# Patient Record
Sex: FEMALE | Race: BLACK OR AFRICAN AMERICAN | NOT HISPANIC OR LATINO | URBAN - METROPOLITAN AREA
[De-identification: names, ages, dates, MRNs, and addresses within clinical notes are randomized per-mention and may not be internally consistent; named-entity substitution may affect disease eponyms.]

---

## 2023-12-08 ENCOUNTER — OFFICE VISIT (OUTPATIENT)
Dept: OBSTETRICS AND GYNECOLOGY | Facility: CLINIC | Age: 23
End: 2023-12-08
Payer: MEDICAID

## 2023-12-08 VITALS
BODY MASS INDEX: 31.65 KG/M2 | WEIGHT: 185.4 LBS | HEIGHT: 64 IN | DIASTOLIC BLOOD PRESSURE: 76 MMHG | SYSTOLIC BLOOD PRESSURE: 108 MMHG

## 2023-12-08 DIAGNOSIS — Z30.8 ENCOUNTER FOR OTHER CONTRACEPTIVE MANAGEMENT: ICD-10-CM

## 2023-12-08 DIAGNOSIS — Z30.42 SURVEILLANCE FOR DEPO-PROVERA CONTRACEPTION: Primary | ICD-10-CM

## 2023-12-08 PROCEDURE — 2500000004 HC RX 250 GENERAL PHARMACY W/ HCPCS (ALT 636 FOR OP/ED)

## 2023-12-08 RX ORDER — MEDROXYPROGESTERONE ACETATE 150 MG/ML
150 INJECTION, SUSPENSION INTRAMUSCULAR ONCE
Status: COMPLETED | OUTPATIENT
Start: 2023-12-08 | End: 2023-12-08

## 2023-12-08 RX ADMIN — MEDROXYPROGESTERONE ACETATE 150 MG: 150 INJECTION, SUSPENSION INTRAMUSCULAR at 11:08

## 2023-12-08 ASSESSMENT — PAIN SCALES - GENERAL: PAINLEVEL: 0-NO PAIN

## 2024-01-12 ENCOUNTER — APPOINTMENT (OUTPATIENT)
Dept: OBSTETRICS AND GYNECOLOGY | Facility: CLINIC | Age: 24
End: 2024-01-12

## 2024-05-26 ENCOUNTER — APPOINTMENT (OUTPATIENT)
Dept: RADIOLOGY | Facility: HOSPITAL | Age: 24
End: 2024-05-26
Payer: MEDICARE

## 2024-05-26 ENCOUNTER — HOSPITAL ENCOUNTER (EMERGENCY)
Facility: HOSPITAL | Age: 24
Discharge: HOME | End: 2024-05-26
Payer: MEDICARE

## 2024-05-26 VITALS
HEART RATE: 80 BPM | RESPIRATION RATE: 18 BRPM | TEMPERATURE: 98.2 F | BODY MASS INDEX: 32.44 KG/M2 | DIASTOLIC BLOOD PRESSURE: 67 MMHG | HEIGHT: 64 IN | OXYGEN SATURATION: 100 % | SYSTOLIC BLOOD PRESSURE: 134 MMHG | WEIGHT: 190 LBS

## 2024-05-26 DIAGNOSIS — N93.9 VAGINA BLEEDING: Primary | ICD-10-CM

## 2024-05-26 DIAGNOSIS — R11.2 NAUSEA AND VOMITING, UNSPECIFIED VOMITING TYPE: ICD-10-CM

## 2024-05-26 LAB
ALBUMIN SERPL-MCNC: 4.2 G/DL (ref 3.5–5)
ALP BLD-CCNC: 122 U/L (ref 35–125)
ALT SERPL-CCNC: 14 U/L (ref 5–40)
ANION GAP SERPL CALC-SCNC: 10 MMOL/L
APPEARANCE UR: CLEAR
AST SERPL-CCNC: 14 U/L (ref 5–40)
BASOPHILS # BLD AUTO: 0.07 X10*3/UL (ref 0–0.1)
BASOPHILS NFR BLD AUTO: 0.7 %
BILIRUB SERPL-MCNC: <0.2 MG/DL (ref 0.1–1.2)
BILIRUB UR STRIP.AUTO-MCNC: NEGATIVE MG/DL
BUN SERPL-MCNC: 11 MG/DL (ref 8–25)
CALCIUM SERPL-MCNC: 9.5 MG/DL (ref 8.5–10.4)
CHLORIDE SERPL-SCNC: 104 MMOL/L (ref 97–107)
CO2 SERPL-SCNC: 24 MMOL/L (ref 24–31)
COLOR UR: ABNORMAL
CREAT SERPL-MCNC: 0.7 MG/DL (ref 0.4–1.6)
EGFRCR SERPLBLD CKD-EPI 2021: >90 ML/MIN/1.73M*2
EOSINOPHIL # BLD AUTO: 0.19 X10*3/UL (ref 0–0.7)
EOSINOPHIL NFR BLD AUTO: 1.8 %
ERYTHROCYTE [DISTWIDTH] IN BLOOD BY AUTOMATED COUNT: 15.9 % (ref 11.5–14.5)
GLUCOSE SERPL-MCNC: 77 MG/DL (ref 65–99)
GLUCOSE UR STRIP.AUTO-MCNC: NORMAL MG/DL
HCG SERPL-ACNC: <1 MIU/ML
HCT VFR BLD AUTO: 40.9 % (ref 36–46)
HGB BLD-MCNC: 12.7 G/DL (ref 12–16)
IMM GRANULOCYTES # BLD AUTO: 0.03 X10*3/UL (ref 0–0.7)
IMM GRANULOCYTES NFR BLD AUTO: 0.3 % (ref 0–0.9)
KETONES UR STRIP.AUTO-MCNC: NEGATIVE MG/DL
LEUKOCYTE ESTERASE UR QL STRIP.AUTO: ABNORMAL
LIPASE SERPL-CCNC: 25 U/L (ref 16–63)
LYMPHOCYTES # BLD AUTO: 2.22 X10*3/UL (ref 1.2–4.8)
LYMPHOCYTES NFR BLD AUTO: 21.3 %
MCH RBC QN AUTO: 25 PG (ref 26–34)
MCHC RBC AUTO-ENTMCNC: 31.1 G/DL (ref 32–36)
MCV RBC AUTO: 81 FL (ref 80–100)
MONOCYTES # BLD AUTO: 0.67 X10*3/UL (ref 0.1–1)
MONOCYTES NFR BLD AUTO: 6.4 %
MUCOUS THREADS #/AREA URNS AUTO: ABNORMAL /LPF
NEUTROPHILS # BLD AUTO: 7.25 X10*3/UL (ref 1.2–7.7)
NEUTROPHILS NFR BLD AUTO: 69.5 %
NITRITE UR QL STRIP.AUTO: NEGATIVE
NRBC BLD-RTO: 0 /100 WBCS (ref 0–0)
PH UR STRIP.AUTO: 7 [PH]
PLATELET # BLD AUTO: 519 X10*3/UL (ref 150–450)
POTASSIUM SERPL-SCNC: 4.1 MMOL/L (ref 3.4–5.1)
PROT SERPL-MCNC: 7.9 G/DL (ref 5.9–7.9)
PROT UR STRIP.AUTO-MCNC: NEGATIVE MG/DL
RBC # BLD AUTO: 5.08 X10*6/UL (ref 4–5.2)
RBC # UR STRIP.AUTO: ABNORMAL /UL
RBC #/AREA URNS AUTO: >20 /HPF
SODIUM SERPL-SCNC: 138 MMOL/L (ref 133–145)
SP GR UR STRIP.AUTO: 1.02
SQUAMOUS #/AREA URNS AUTO: ABNORMAL /HPF
UROBILINOGEN UR STRIP.AUTO-MCNC: NORMAL MG/DL
WBC # BLD AUTO: 10.4 X10*3/UL (ref 4.4–11.3)
WBC #/AREA URNS AUTO: ABNORMAL /HPF

## 2024-05-26 PROCEDURE — 36415 COLL VENOUS BLD VENIPUNCTURE: CPT

## 2024-05-26 PROCEDURE — 96374 THER/PROPH/DIAG INJ IV PUSH: CPT

## 2024-05-26 PROCEDURE — 87491 CHLMYD TRACH DNA AMP PROBE: CPT | Mod: WESLAB

## 2024-05-26 PROCEDURE — 76856 US EXAM PELVIC COMPLETE: CPT

## 2024-05-26 PROCEDURE — 87086 URINE CULTURE/COLONY COUNT: CPT | Mod: WESLAB

## 2024-05-26 PROCEDURE — 81001 URINALYSIS AUTO W/SCOPE: CPT

## 2024-05-26 PROCEDURE — 76856 US EXAM PELVIC COMPLETE: CPT | Performed by: RADIOLOGY

## 2024-05-26 PROCEDURE — 80053 COMPREHEN METABOLIC PANEL: CPT

## 2024-05-26 PROCEDURE — 83690 ASSAY OF LIPASE: CPT

## 2024-05-26 PROCEDURE — 85025 COMPLETE CBC W/AUTO DIFF WBC: CPT

## 2024-05-26 PROCEDURE — 76830 TRANSVAGINAL US NON-OB: CPT | Performed by: RADIOLOGY

## 2024-05-26 PROCEDURE — 2500000004 HC RX 250 GENERAL PHARMACY W/ HCPCS (ALT 636 FOR OP/ED)

## 2024-05-26 PROCEDURE — 84702 CHORIONIC GONADOTROPIN TEST: CPT

## 2024-05-26 PROCEDURE — 96361 HYDRATE IV INFUSION ADD-ON: CPT

## 2024-05-26 PROCEDURE — 99284 EMERGENCY DEPT VISIT MOD MDM: CPT | Mod: 25

## 2024-05-26 RX ORDER — ONDANSETRON HYDROCHLORIDE 2 MG/ML
4 INJECTION, SOLUTION INTRAVENOUS ONCE
Status: COMPLETED | OUTPATIENT
Start: 2024-05-26 | End: 2024-05-26

## 2024-05-26 RX ORDER — ONDANSETRON 4 MG/1
4 TABLET, FILM COATED ORAL EVERY 6 HOURS
Qty: 12 TABLET | Refills: 0 | Status: SHIPPED | OUTPATIENT
Start: 2024-05-26 | End: 2024-05-29

## 2024-05-26 RX ADMIN — SODIUM CHLORIDE 1000 ML: 900 INJECTION, SOLUTION INTRAVENOUS at 21:08

## 2024-05-26 RX ADMIN — ONDANSETRON 4 MG: 2 INJECTION INTRAMUSCULAR; INTRAVENOUS at 21:09

## 2024-05-26 ASSESSMENT — PAIN - FUNCTIONAL ASSESSMENT: PAIN_FUNCTIONAL_ASSESSMENT: 0-10

## 2024-05-26 ASSESSMENT — PAIN SCALES - GENERAL: PAINLEVEL_OUTOF10: 0 - NO PAIN

## 2024-05-26 ASSESSMENT — COLUMBIA-SUICIDE SEVERITY RATING SCALE - C-SSRS
1. IN THE PAST MONTH, HAVE YOU WISHED YOU WERE DEAD OR WISHED YOU COULD GO TO SLEEP AND NOT WAKE UP?: NO
6. HAVE YOU EVER DONE ANYTHING, STARTED TO DO ANYTHING, OR PREPARED TO DO ANYTHING TO END YOUR LIFE?: NO
2. HAVE YOU ACTUALLY HAD ANY THOUGHTS OF KILLING YOURSELF?: NO

## 2024-05-26 NOTE — Clinical Note
Ursula Bocanegra was seen and treated in our emergency department on 5/26/2024.  She may return to work on 05/28/2024.       If you have any questions or concerns, please don't hesitate to call.      Pema Fisher PA-C

## 2024-05-27 LAB — HOLD SPECIMEN: NORMAL

## 2024-05-27 NOTE — ED PROVIDER NOTES
HPI   Chief Complaint   Patient presents with    Vomiting     Nausea for the past week. Vomiting the last couple of days. Has had some spotting as well, along with a migraine. Took Plan B last week, but was feeling this way since before then.       Patient is a 23-year-old female presenting to the emergency department for evaluation of nausea, vomiting, and vaginal bleeding.  Patient states she has been off of Depo-Provera shot since March.  She states since then she has had intermittent spotting and abnormal periods.  She states she also has had some occasional vomiting.  She states 1 week ago she had unprotected sex and took a Plan B.  She states since then she has had increased nausea, vomiting, and increased vaginal bleeding.  She states it has progressively worsened over the past 2 days.  She denies chest pain, shortness of breath, fever, chills, recent travel, recent illness, cough, congestion, headaches, numbness, tingling, dysuria, constipation, diarrhea.  Patient also concerned about STDs and asking for testing.                          [place] Coma Scale Score: 15                     Patient History   Past Medical History:   Diagnosis Date    Other conditions influencing health status 04/21/2015    No significant past surgical history    Personal history of other diseases of the respiratory system 04/21/2015    History of asthma    Personal history of other mental and behavioral disorders     History of behavior problem    Personal history of other mental and behavioral disorders 12/03/2017    History of depression    Personal history of other mental and behavioral disorders 12/03/2017    History of attention deficit hyperactivity disorder (ADHD)    Personal history of other specified conditions     History of lump of left breast    Personal history of other specified conditions 12/20/2017    History of lump of left breast    Right upper quadrant pain     Abdominal pain, RUQ (right upper quadrant)     Unspecified mood (affective) disorder ([facility]) 12/03/2017    Mood disorder     Past Surgical History:   Procedure Laterality Date    OTHER SURGICAL HISTORY  03/17/2020    Scar removal    OTHER SURGICAL HISTORY  03/17/2020    Breast surgery     No family history on file.  Social History     Tobacco Use    Smoking status: Not on file    Smokeless tobacco: Not on file   Substance Use Topics    Alcohol use: Not on file    Drug use: Not on file       Physical Exam   ED Triage Vitals [05/26/24 2032]   Temperature Heart Rate Respirations BP   36.8 °C (98.2 °F) 91 18 (!) 118/91      SpO2 Temp Source Heart Rate Source Patient Position   -- Tympanic -- Sitting      BP Location FiO2 (%)     Left arm --       Physical Exam  Vitals and nursing note reviewed.   Constitutional:       General: She is not in acute distress.     Appearance: Normal appearance. She is not ill-appearing or toxic-appearing.   HENT:      Head: Normocephalic and atraumatic.      Nose: Nose normal.      Mouth/Throat:      Mouth: Mucous membranes are moist.   Eyes:      Pupils: Pupils are equal, round, and reactive to light.   Cardiovascular:      Rate and Rhythm: Normal rate and regular rhythm.      Pulses: Normal pulses.      Heart sounds: Normal heart sounds. No murmur heard.     No friction rub. No gallop.   Pulmonary:      Effort: Pulmonary effort is normal.      Breath sounds: Normal breath sounds. No wheezing, rhonchi or rales.   Abdominal:      Palpations: Abdomen is soft.      Tenderness: There is abdominal tenderness (Suprapubic tenderness). There is no guarding or rebound.   Musculoskeletal:         General: Normal range of motion.      Cervical back: Normal range of motion.   Skin:     General: Skin is warm and dry.   Neurological:      General: No focal deficit present.      Mental Status: She is alert and oriented to person, place, and time.      Sensory: No sensory deficit.      Motor: No weakness.   Psychiatric:         Mood and Affect: Mood  normal.         Behavior: Behavior normal.         ED Course & MDM   Diagnoses as of 05/27/24 0237   Nausea and vomiting, unspecified vomiting type       Medical Decision Making  **Disclaimer parts of this chart have been completed using voice recognition software. Please excuse any errors of transcription.     Evaluated this patient independently and my supervising physician was available for consultation.    HPI: Detailed above.    Exam: A medically appropriate exam performed, outlined above, given the known history and presentation.    History obtained from: Patient    Labs/Diagnostics:  Labs Reviewed   CBC WITH AUTO DIFFERENTIAL - Abnormal       Result Value    WBC 10.4      nRBC 0.0      RBC 5.08      Hemoglobin 12.7      Hematocrit 40.9      MCV 81      MCH 25.0 (*)     MCHC 31.1 (*)     RDW 15.9 (*)     Platelets 519 (*)     Neutrophils % 69.5      Immature Granulocytes %, Automated 0.3      Lymphocytes % 21.3      Monocytes % 6.4      Eosinophils % 1.8      Basophils % 0.7      Neutrophils Absolute 7.25      Immature Granulocytes Absolute, Automated 0.03      Lymphocytes Absolute 2.22      Monocytes Absolute 0.67      Eosinophils Absolute 0.19      Basophils Absolute 0.07     URINALYSIS WITH REFLEX CULTURE AND MICROSCOPIC - Abnormal    Color, Urine Light-Yellow      Appearance, Urine Clear      Specific Gravity, Urine 1.019      pH, Urine 7.0      Protein, Urine NEGATIVE      Glucose, Urine Normal      Blood, Urine 1.0 (3+) (*)     Ketones, Urine NEGATIVE      Bilirubin, Urine NEGATIVE      Urobilinogen, Urine Normal      Nitrite, Urine NEGATIVE      Leukocyte Esterase, Urine 75 Rodger/µL (*)    MICROSCOPIC ONLY, URINE - Abnormal    WBC, Urine 11-20 (*)     RBC, Urine >20 (*)     Squamous Epithelial Cells, Urine 1-9 (SPARSE)      Mucus, Urine FEW     COMPREHENSIVE METABOLIC PANEL - Normal    Glucose 77      Sodium 138      Potassium 4.1      Chloride 104      Bicarbonate 24      Urea Nitrogen 11      Creatinine  0.70      eGFR >90      Calcium 9.5      Albumin 4.2      Alkaline Phosphatase 122      Total Protein 7.9      AST 14      Bilirubin, Total <0.2      ALT 14      Anion Gap 10     LIPASE - Normal    Lipase 25     URINE CULTURE   HUMAN CHORIONIC GONADOTROPIN, SERUM QUANTITATIVE    HCG, Beta-Quantitative <1     URINALYSIS WITH REFLEX CULTURE AND MICROSCOPIC    Narrative:     The following orders were created for panel order Urinalysis with Reflex Culture and Microscopic.  Procedure                               Abnormality         Status                     ---------                               -----------         ------                     Urinalysis with Reflex C...[[id number]]  Abnormal            Final result               Extra Urine Gray Tube[[id number]]                            In process                   Please view results for these tests on the individual orders.   EXTRA URINE GRAY TUBE   C. TRACHOMATIS + N. GONORRHOEAE, AMPLIFIED     US PELVIS TRANSABDOMINAL WITH TRANSVAGINAL   Final Result   Unremarkable pelvic ultrasound.        MACRO:   None.        Signed by: Evan Finkelstein 5/26/2024 11:05 PM   Dictation workstation:   [id number]        EMERGENCY DEPARTMENT COURSE and DIFFERENTIAL DIAGNOSIS/MDM:  Patient is a 23-year-old female presenting to the emergency department for evaluation of nausea, vomiting, and abdominal pain.  On physical exam vital signs remarkable for diastolic hypertension but otherwise stable patient is in no acute distress.  She is in generalized tenderness to palpation suprapubic region with no rebound or guarding.  Diagnostic labs ordered as well as ultrasound of the pelvis.  CMP showed no electrolyte abnormalities.  Lipase normal.  Quantitative hCG negative.  CBC showed no leukocytosis or anemia.  Urine showed no evidence of infection as there is no bacteria despite the leukocyte esterase she does have squamous epithelial cells.  Ultrasound of the pelvis showed unremarkable  "pelvic ultrasound with no cysts.  Patient was discharged in stable condition.  She will follow-up with primary care physician and OB/GYN outpatient within the next 1 to 2 days to discuss further birth control plans.  She will return to the emergency department with any new or worsening symptoms.    Discussed with patient that more than 50% of abdominal pain that comes to the Emergency Department goes undiagnosed and that there were no emergent findings in workup today. Discussed that certain diagnoese such as appendicitis, colitis, diverticulitis, cholelithiasis or other illnesses are undetectable early on in their course and may not be seen on the first visit.  I recommended abdominal re-examination in 12-24 hours if  symptoms are not significantly improved, sooner if worsening.    Emergent pathologies were considered for this patient, although I have low suspicion for anything acutely emergent given patient's clinical presentation, history, physical exam, stable vital signs, and relatively unremarkable workup.  Discharging patient home is reasonable plan of care for outpatient management.     All labs, imaging, and diagnostic studies were reviewed by me and patient was counseled on clinical impression, expectations, and plan.  Patient was educated to follow-up with PCP in the following 1-2 days.  All questions from patient were answered. They elicited understanding and were agreeable to course of treatment.  Patient was discharged in stable condition and given strict return precautions.      Vitals:    Vitals:    05/26/24 2032 05/26/24 2350   BP: (!) 118/91 134/67   BP Location: Left arm    Patient Position: Sitting    Pulse: 91 80   Resp: 18 18   Temp: 36.8 °C (98.2 °F)    TempSrc: Tympanic    SpO2:  100%   Weight: 86.2 kg (190 lb)    Height: 1.626 m (5' 4\")      History Limited by:    None    Independent history obtained from:    None    External records reviewed:    None    Diagnostics interpreted by " me:    None    Discussions with other clinicians:    None    Chronic conditions impacting care:    None    Social determinants of health affecting care:    None    Diagnostic tests considered but not performed: None    ED Medications managed:    Medications   sodium chloride 0.9 % bolus 1,000 mL (0 mL intravenous Stopped 5/26/24 2208)   ondansetron (Zofran) injection 4 mg (4 mg intravenous Given 5/26/24 2109)       Prescription drugs considered:    Antiemetics Zofran    Screenings:              Procedure  Procedures     Pema Fisher PA-C  05/27/24 0236

## 2024-05-27 NOTE — DISCHARGE INSTRUCTIONS
Follow-up with primary care physician and OB/GYN outpatient within the next 1 to 2 days.  Return to the emergency department anytime with any new or worsening symptoms.

## 2024-05-28 LAB
BACTERIA UR CULT: NORMAL
C TRACH RRNA SPEC QL NAA+PROBE: NEGATIVE
N GONORRHOEA DNA SPEC QL PROBE+SIG AMP: NEGATIVE

## 2024-05-31 ENCOUNTER — TELEPHONE (OUTPATIENT)
Dept: OBSTETRICS AND GYNECOLOGY | Facility: CLINIC | Age: 24
End: 2024-05-31
Payer: MEDICARE

## 2024-05-31 NOTE — TELEPHONE ENCOUNTER
Est pt calling to schedule ER f/u. Pt states she was seen in ER for heavy menses and vomiting. Pt took a plan B and was very sick after. Pt missed last depo. Appt made for 06/11.

## 2024-06-11 ENCOUNTER — APPOINTMENT (OUTPATIENT)
Dept: OBSTETRICS AND GYNECOLOGY | Facility: CLINIC | Age: 24
End: 2024-06-11
Payer: MEDICARE

## 2024-06-26 PROCEDURE — 87086 URINE CULTURE/COLONY COUNT: CPT

## 2024-06-26 PROCEDURE — 87591 N.GONORRHOEAE DNA AMP PROB: CPT

## 2024-06-26 PROCEDURE — 87661 TRICHOMONAS VAGINALIS AMPLIF: CPT

## 2024-06-26 PROCEDURE — 87491 CHLMYD TRACH DNA AMP PROBE: CPT

## 2024-06-27 ENCOUNTER — LAB REQUISITION (OUTPATIENT)
Dept: LAB | Facility: HOSPITAL | Age: 24
End: 2024-06-27
Payer: COMMERCIAL

## 2024-06-27 DIAGNOSIS — J20.9 ACUTE BRONCHITIS, UNSPECIFIED: ICD-10-CM

## 2024-06-27 DIAGNOSIS — A56.01 CHLAMYDIAL CYSTITIS AND URETHRITIS: ICD-10-CM

## 2024-06-27 DIAGNOSIS — B37.31 ACUTE CANDIDIASIS OF VULVA AND VAGINA: ICD-10-CM

## 2024-06-27 DIAGNOSIS — R30.0 DYSURIA: ICD-10-CM

## 2024-06-27 LAB
C TRACH RRNA SPEC QL NAA+PROBE: POSITIVE
N GONORRHOEA DNA SPEC QL PROBE+SIG AMP: NEGATIVE
T VAGINALIS RRNA SPEC QL NAA+PROBE: POSITIVE

## 2024-06-28 LAB — BACTERIA UR CULT: NORMAL

## 2024-07-02 ENCOUNTER — OFFICE VISIT (OUTPATIENT)
Dept: PRIMARY CARE | Facility: CLINIC | Age: 24
End: 2024-07-02
Payer: COMMERCIAL

## 2024-07-02 ENCOUNTER — LAB (OUTPATIENT)
Dept: LAB | Facility: LAB | Age: 24
End: 2024-07-02
Payer: COMMERCIAL

## 2024-07-02 VITALS
HEIGHT: 64 IN | WEIGHT: 191 LBS | HEART RATE: 114 BPM | SYSTOLIC BLOOD PRESSURE: 112 MMHG | OXYGEN SATURATION: 98 % | RESPIRATION RATE: 18 BRPM | BODY MASS INDEX: 32.61 KG/M2 | DIASTOLIC BLOOD PRESSURE: 80 MMHG | TEMPERATURE: 97.6 F

## 2024-07-02 DIAGNOSIS — Z00.01 ENCOUNTER FOR WELL ADULT EXAM WITH ABNORMAL FINDINGS: Primary | ICD-10-CM

## 2024-07-02 DIAGNOSIS — A74.9 POSITIVE CHLAMYIDA TEST: ICD-10-CM

## 2024-07-02 DIAGNOSIS — F41.9 ANXIETY AND DEPRESSION: ICD-10-CM

## 2024-07-02 DIAGNOSIS — F32.A ANXIETY AND DEPRESSION: ICD-10-CM

## 2024-07-02 DIAGNOSIS — Z20.2 EXPOSURE TO STD: ICD-10-CM

## 2024-07-02 DIAGNOSIS — G47.30 SLEEP APNEA, UNSPECIFIED TYPE: ICD-10-CM

## 2024-07-02 LAB
HIV 1+2 AB+HIV1 P24 AG SERPL QL IA: NONREACTIVE
TREPONEMA PALLIDUM IGG+IGM AB [PRESENCE] IN SERUM OR PLASMA BY IMMUNOASSAY: NONREACTIVE

## 2024-07-02 PROCEDURE — 36415 COLL VENOUS BLD VENIPUNCTURE: CPT

## 2024-07-02 PROCEDURE — 87389 HIV-1 AG W/HIV-1&-2 AB AG IA: CPT

## 2024-07-02 PROCEDURE — 87491 CHLMYD TRACH DNA AMP PROBE: CPT

## 2024-07-02 PROCEDURE — 87591 N.GONORRHOEAE DNA AMP PROB: CPT

## 2024-07-02 PROCEDURE — 99385 PREV VISIT NEW AGE 18-39: CPT | Performed by: NURSE PRACTITIONER

## 2024-07-02 PROCEDURE — 1036F TOBACCO NON-USER: CPT | Performed by: NURSE PRACTITIONER

## 2024-07-02 PROCEDURE — 99213 OFFICE O/P EST LOW 20 MIN: CPT | Performed by: NURSE PRACTITIONER

## 2024-07-02 PROCEDURE — 87661 TRICHOMONAS VAGINALIS AMPLIF: CPT

## 2024-07-02 PROCEDURE — 86780 TREPONEMA PALLIDUM: CPT

## 2024-07-02 RX ORDER — ALBUTEROL SULFATE 90 UG/1
2 AEROSOL, METERED RESPIRATORY (INHALATION) 4 TIMES DAILY PRN
COMMUNITY

## 2024-07-02 ASSESSMENT — ENCOUNTER SYMPTOMS
CONSTITUTIONAL NEGATIVE: 1
MUSCULOSKELETAL NEGATIVE: 1
EYES NEGATIVE: 1
ALLERGIC/IMMUNOLOGIC NEGATIVE: 1
CARDIOVASCULAR NEGATIVE: 1
NERVOUS/ANXIOUS: 1
HEMATOLOGIC/LYMPHATIC NEGATIVE: 1
GASTROINTESTINAL NEGATIVE: 1
ENDOCRINE NEGATIVE: 1
NEUROLOGICAL NEGATIVE: 1
RESPIRATORY NEGATIVE: 1

## 2024-07-02 ASSESSMENT — LIFESTYLE VARIABLES
HAS A RELATIVE, FRIEND, DOCTOR, OR ANOTHER HEALTH PROFESSIONAL EXPRESSED CONCERN ABOUT YOUR DRINKING OR SUGGESTED YOU CUT DOWN: NO
SKIP TO QUESTIONS 9-10: 0
HOW MANY STANDARD DRINKS CONTAINING ALCOHOL DO YOU HAVE ON A TYPICAL DAY: 3 OR 4
HOW OFTEN DO YOU HAVE A DRINK CONTAINING ALCOHOL: 2-4 TIMES A MONTH
HOW OFTEN DO YOU HAVE SIX OR MORE DRINKS ON ONE OCCASION: NEVER
HAVE YOU OR SOMEONE ELSE BEEN INJURED AS A RESULT OF YOUR DRINKING: NO
AUDIT-C TOTAL SCORE: 3
AUDIT TOTAL SCORE: -1

## 2024-07-02 ASSESSMENT — PATIENT HEALTH QUESTIONNAIRE - PHQ9
2. FEELING DOWN, DEPRESSED OR HOPELESS: NOT AT ALL
SUM OF ALL RESPONSES TO PHQ9 QUESTIONS 1 AND 2: 0
1. LITTLE INTEREST OR PLEASURE IN DOING THINGS: NOT AT ALL

## 2024-07-02 ASSESSMENT — PAIN SCALES - GENERAL: PAINLEVEL: 0-NO PAIN

## 2024-07-02 NOTE — PROGRESS NOTES
"Chief Complaint  Ursula Bocanegra is a 23 y.o. female presenting for \"Annual Exam (Physical- asking for complete std panel including HIV/GOT NEXPLANON 6/21/24).\"    HPI     Ursula Bocanegra is a 23 y.o. female presenting for an annual exam and she would like STD tested due to testing positive for chlamydia and trich recently.   She was treated and would like make sure she is clear of any STDs, has a nexplanon Has and issue with heartburn, Snores loudly, has had a sleep study in the past but she was pregnant in first trimester and couldn't sleep.  So she never got results.       Past Medical History  Patient Active Problem List    Diagnosis Date Noted    Surveillance for Depo-Provera contraception 12/08/2023    Encounter for other contraceptive management 12/08/2023        Medications  Current Outpatient Medications   Medication Instructions    albuterol 90 mcg/actuation inhaler 2 puffs, inhalation, 4 times daily PRN        Surgical History  She has a past surgical history that includes Other surgical history (03/17/2020) and Other surgical history (03/17/2020).     Social History  She reports that she has never smoked. She has never been exposed to tobacco smoke. She has never used smokeless tobacco. She reports current alcohol use. She reports that she does not use drugs.    Family History  No family history on file.     Allergies  Bee pollen and Dog dander    ROS  Review of Systems   Constitutional: Negative.    HENT: Negative.     Eyes: Negative.    Respiratory: Negative.     Cardiovascular: Negative.    Gastrointestinal: Negative.    Endocrine: Negative.    Genitourinary: Negative.    Musculoskeletal: Negative.    Skin:  Positive for rash.   Allergic/Immunologic: Negative.    Neurological: Negative.    Hematological: Negative.    Psychiatric/Behavioral:  The patient is nervous/anxious.         Last Recorded Vitals  /80 (BP Location: Left arm, Patient Position: Sitting, BP Cuff Size: Adult)   Pulse (!) 114 "   Temp 36.4 °C (97.6 °F)   Resp 18   Wt 86.6 kg (191 lb)   SpO2 98%     Physical Exam  Vitals and nursing note reviewed.   Constitutional:       Appearance: Normal appearance.   HENT:      Head: Normocephalic and atraumatic.      Right Ear: Tympanic membrane, ear canal and external ear normal.      Left Ear: Tympanic membrane, ear canal and external ear normal.      Nose: Nose normal.      Mouth/Throat:      Mouth: Mucous membranes are moist.      Pharynx: Oropharynx is clear.   Eyes:      Extraocular Movements: Extraocular movements intact.      Conjunctiva/sclera: Conjunctivae normal.      Pupils: Pupils are equal, round, and reactive to light.   Neck:      Thyroid: No thyromegaly.   Cardiovascular:      Rate and Rhythm: Normal rate and regular rhythm.      Pulses: Normal pulses.      Heart sounds: Normal heart sounds, S1 normal and S2 normal.   Pulmonary:      Effort: Pulmonary effort is normal.      Breath sounds: Normal breath sounds. No wheezing or rhonchi.   Abdominal:      General: Bowel sounds are normal.      Palpations: Abdomen is soft. There is no mass.      Tenderness: There is no abdominal tenderness. There is no guarding.   Genitourinary:     Comments: Not examined  Musculoskeletal:         General: Normal range of motion.      Cervical back: Normal range of motion.      Right lower leg: No edema.      Left lower leg: No edema.   Lymphadenopathy:      Cervical: No cervical adenopathy.   Skin:     General: Skin is warm and dry.      Capillary Refill: Capillary refill takes less than 2 seconds.      Findings: No rash.   Neurological:      General: No focal deficit present.      Mental Status: She is alert and oriented to person, place, and time. Mental status is at baseline.      Cranial Nerves: Cranial nerves 2-12 are intact. No cranial nerve deficit.      Sensory: Sensation is intact.      Motor: Motor function is intact.      Coordination: Coordination is intact.      Gait: Gait is intact.    Psychiatric:         Mood and Affect: Mood normal.         Behavior: Behavior normal.         Thought Content: Thought content normal.         Judgment: Judgment normal.         Relevant Results      Assessment/Plan   Ursula was seen today for annual exam.  Diagnoses and all orders for this visit:  Encounter for well adult exam with abnormal findings (Primary)  Exposure to STD  -     Syphilis Screen with Reflex; Future  -     C. Trachomatis / N. Gonorrhoeae, Amplified Detection; Future  -     HIV 1/2 Antigen/Antibody Screen with Reflex to Confirmation; Future  -     Trichomonas vaginalis, Nucleic Acid Detection; Future  Anxiety and depression  -     Referral to Psychology; Future  Sleep apnea, unspecified type  -     Referral to Adult Sleep Medicine; Future          COUNSELING      Medication education:              Education:  The patient is counseled regarding potential side-effects of any and all new medications             Understanding:  Patient expressed understanding             Adherence:  No barriers to adherence identified        Nenita Brian, APRN-CNP

## 2024-07-03 PROBLEM — A74.9 POSITIVE CHLAMYIDA TEST: Status: ACTIVE | Noted: 2024-07-03

## 2024-07-03 LAB
C TRACH RRNA SPEC QL NAA+PROBE: POSITIVE
N GONORRHOEA DNA SPEC QL PROBE+SIG AMP: NEGATIVE
T VAGINALIS RRNA SPEC QL NAA+PROBE: NEGATIVE

## 2024-07-03 RX ORDER — AZITHROMYCIN 250 MG/1
1000 TABLET, FILM COATED ORAL ONCE
Qty: 4 TABLET | Refills: 0 | Status: SHIPPED | OUTPATIENT
Start: 2024-07-03 | End: 2024-07-03

## 2024-07-03 NOTE — RESULT ENCOUNTER NOTE
Advise patient that she has positive for chlamydia I have sent azithromycin 1000 mg total she will take 4 tablets 1 time advised her to use condoms and she needs to make sure that any partners are being treated also

## 2024-12-06 ENCOUNTER — APPOINTMENT (OUTPATIENT)
Dept: CARDIOLOGY | Facility: HOSPITAL | Age: 24
End: 2024-12-06
Payer: COMMERCIAL

## 2024-12-06 ENCOUNTER — HOSPITAL ENCOUNTER (EMERGENCY)
Facility: HOSPITAL | Age: 24
Discharge: HOME | End: 2024-12-06
Payer: COMMERCIAL

## 2024-12-06 VITALS
RESPIRATION RATE: 18 BRPM | OXYGEN SATURATION: 99 % | HEIGHT: 65 IN | WEIGHT: 190 LBS | HEART RATE: 85 BPM | DIASTOLIC BLOOD PRESSURE: 87 MMHG | TEMPERATURE: 97.7 F | SYSTOLIC BLOOD PRESSURE: 137 MMHG | BODY MASS INDEX: 31.65 KG/M2

## 2024-12-06 DIAGNOSIS — J45.41 MODERATE PERSISTENT ASTHMA WITH EXACERBATION (HHS-HCC): Primary | ICD-10-CM

## 2024-12-06 LAB
ATRIAL RATE: 93 BPM
FLUAV RNA RESP QL NAA+PROBE: NOT DETECTED
FLUBV RNA RESP QL NAA+PROBE: NOT DETECTED
P AXIS: 64 DEGREES
P OFFSET: 209 MS
P ONSET: 162 MS
PR INTERVAL: 124 MS
Q ONSET: 224 MS
QRS COUNT: 15 BEATS
QRS DURATION: 72 MS
QT INTERVAL: 344 MS
QTC CALCULATION(BAZETT): 427 MS
QTC FREDERICIA: 398 MS
R AXIS: 57 DEGREES
SARS-COV-2 RNA RESP QL NAA+PROBE: NOT DETECTED
T AXIS: 52 DEGREES
T OFFSET: 396 MS
VENTRICULAR RATE: 93 BPM

## 2024-12-06 PROCEDURE — 99284 EMERGENCY DEPT VISIT MOD MDM: CPT

## 2024-12-06 PROCEDURE — 87636 SARSCOV2 & INF A&B AMP PRB: CPT

## 2024-12-06 PROCEDURE — 94640 AIRWAY INHALATION TREATMENT: CPT | Mod: 59

## 2024-12-06 PROCEDURE — 93005 ELECTROCARDIOGRAM TRACING: CPT

## 2024-12-06 PROCEDURE — 96372 THER/PROPH/DIAG INJ SC/IM: CPT

## 2024-12-06 PROCEDURE — 87635 SARS-COV-2 COVID-19 AMP PRB: CPT

## 2024-12-06 PROCEDURE — 2500000002 HC RX 250 W HCPCS SELF ADMINISTERED DRUGS (ALT 637 FOR MEDICARE OP, ALT 636 FOR OP/ED)

## 2024-12-06 PROCEDURE — 2500000004 HC RX 250 GENERAL PHARMACY W/ HCPCS (ALT 636 FOR OP/ED)

## 2024-12-06 RX ORDER — ALBUTEROL SULFATE 5 MG/ML
2.5 SOLUTION RESPIRATORY (INHALATION) EVERY 6 HOURS PRN
Qty: 20 ML | Refills: 0 | Status: SHIPPED | OUTPATIENT
Start: 2024-12-06 | End: 2025-01-05

## 2024-12-06 RX ORDER — IPRATROPIUM BROMIDE AND ALBUTEROL SULFATE 2.5; .5 MG/3ML; MG/3ML
3 SOLUTION RESPIRATORY (INHALATION) ONCE
Status: COMPLETED | OUTPATIENT
Start: 2024-12-06 | End: 2024-12-06

## 2024-12-06 RX ORDER — METHYLPREDNISOLONE 4 MG/1
TABLET ORAL
Qty: 21 TABLET | Refills: 0 | Status: SHIPPED | OUTPATIENT
Start: 2024-12-06 | End: 2024-12-12

## 2024-12-06 RX ORDER — ALBUTEROL SULFATE 90 UG/1
2 INHALANT RESPIRATORY (INHALATION) EVERY 4 HOURS PRN
Qty: 18 G | Refills: 0 | Status: SHIPPED | OUTPATIENT
Start: 2024-12-06 | End: 2025-01-05

## 2024-12-06 ASSESSMENT — LIFESTYLE VARIABLES
HAVE YOU EVER FELT YOU SHOULD CUT DOWN ON YOUR DRINKING: NO
EVER FELT BAD OR GUILTY ABOUT YOUR DRINKING: NO
TOTAL SCORE: 0
HAVE PEOPLE ANNOYED YOU BY CRITICIZING YOUR DRINKING: NO
EVER HAD A DRINK FIRST THING IN THE MORNING TO STEADY YOUR NERVES TO GET RID OF A HANGOVER: NO

## 2024-12-06 ASSESSMENT — PAIN SCALES - GENERAL: PAINLEVEL_OUTOF10: 0 - NO PAIN

## 2024-12-06 ASSESSMENT — PAIN - FUNCTIONAL ASSESSMENT: PAIN_FUNCTIONAL_ASSESSMENT: 0-10

## 2024-12-06 NOTE — ED TRIAGE NOTES
having centralized chest pain and Trouble breathing the last couple days. Pt has been coughing. Pt has a history of asthma and her inhaler isn't working and has no breathing treatments

## 2024-12-06 NOTE — Clinical Note
Ursula Bocanegra was seen and treated in our emergency department on 12/6/2024.  She may return to work on 12/09/2024.       If you have any questions or concerns, please don't hesitate to call.      Sarwat Hernandez PA-C

## 2024-12-07 NOTE — ED PROVIDER NOTES
HPI   Chief Complaint   Patient presents with    Shortness of Breath       HPI  Patient is a 24-year-old female with self-reported history of asthma who presents to ED for acute shortness of breath.  Patient states she can feel tightness in her chest that has been worsening over the last few days.  She denies any overt chest pain.  She is not in respiratory distress and she is speaking in full sentences.  She denies any recent illness, fever chills, cough or congestion, chest pain, abdominal pain or NVD, urinary symptoms.  No recent hospitalizations or surgeries.  No recent travel or sick contacts.      Patient History   Past Medical History:   Diagnosis Date    Other conditions influencing health status 04/21/2015    No significant past surgical history    Personal history of other diseases of the respiratory system 04/21/2015    History of asthma    Personal history of other mental and behavioral disorders     History of behavior problem    Personal history of other mental and behavioral disorders 12/03/2017    History of depression    Personal history of other mental and behavioral disorders 12/03/2017    History of attention deficit hyperactivity disorder (ADHD)    Personal history of other specified conditions     History of lump of left breast    Personal history of other specified conditions 12/20/2017    History of lump of left breast    Right upper quadrant pain     Abdominal pain, RUQ (right upper quadrant)    Unspecified mood (affective) disorder (CMS-HCC) 12/03/2017    Mood disorder     Past Surgical History:   Procedure Laterality Date    OTHER SURGICAL HISTORY  03/17/2020    Scar removal    OTHER SURGICAL HISTORY  03/17/2020    Breast surgery     No family history on file.  Social History     Tobacco Use    Smoking status: Never     Passive exposure: Never    Smokeless tobacco: Never   Substance Use Topics    Alcohol use: Yes    Drug use: Never       Physical Exam   ED Triage Vitals [12/06/24 1112]    Temperature Heart Rate Respirations BP   36.5 °C (97.7 °F) 85 18 137/87      Pulse Ox Temp Source Heart Rate Source Patient Position   99 % Temporal Monitor Sitting      BP Location FiO2 (%)     Left arm --       Physical Exam  Vitals reviewed.   Constitutional:       General: She is not in acute distress.     Appearance: Normal appearance. She is not ill-appearing.   HENT:      Head: Normocephalic and atraumatic.   Eyes:      Extraocular Movements: Extraocular movements intact.   Cardiovascular:      Rate and Rhythm: Normal rate and regular rhythm.      Heart sounds: Normal heart sounds.   Pulmonary:      Breath sounds: Wheezing present.   Abdominal:      General: Abdomen is flat.   Musculoskeletal:         General: Normal range of motion.      Cervical back: Normal range of motion and neck supple.   Skin:     General: Skin is warm and dry.   Neurological:      Mental Status: She is alert and oriented to person, place, and time.   Psychiatric:         Mood and Affect: Mood normal.         Behavior: Behavior normal.           ED Course & MDM   Diagnoses as of 12/06/24 1923   Moderate persistent asthma with exacerbation ([facility])                 No data recorded     Domingo Coma Scale Score: 15 (12/06/24 1113 : Kimberly Carroll RN)                           Medical Decision Making  Parts of this chart have been completed using voice recognition software. Please excuse any errors of transcription.  My thought process and reason for plan has been formulated from the time that I saw the patient until the time of disposition and is not specific to one specific moment during their visit and furthermore my MDM encompasses this entire chart and not only this text box.    HPI:   A medically appropriate HPI was obtained, outlined above.    Ursula Bocanegra is a  24 y.o. female    Chief Complaint   Patient presents with    Shortness of Breath       Past Medical History:   Diagnosis Date    Other conditions influencing health  "status 04/21/2015    No significant past surgical history    Personal history of other diseases of the respiratory system 04/21/2015    History of asthma    Personal history of other mental and behavioral disorders     History of behavior problem    Personal history of other mental and behavioral disorders 12/03/2017    History of depression    Personal history of other mental and behavioral disorders 12/03/2017    History of attention deficit hyperactivity disorder (ADHD)    Personal history of other specified conditions     History of lump of left breast    Personal history of other specified conditions 12/20/2017    History of lump of left breast    Right upper quadrant pain     Abdominal pain, RUQ (right upper quadrant)    Unspecified mood (affective) disorder ([facility]) 12/03/2017    Mood disorder       Past Surgical History:   Procedure Laterality Date    OTHER SURGICAL HISTORY  03/17/2020    Scar removal    OTHER SURGICAL HISTORY  03/17/2020    Breast surgery       Social History     Tobacco Use    Smoking status: Never     Passive exposure: Never    Smokeless tobacco: Never   Substance Use Topics    Alcohol use: Yes    Drug use: Never       No family history on file.    Allergies   Allergen Reactions    Bee Pollen Hives    Dog Dander Rash       Current Outpatient Medications   Medication Instructions    albuterol 90 mcg/actuation inhaler 2 puffs, inhalation, 4 times daily PRN    albuterol 90 mcg/actuation inhaler 2 puffs, inhalation, Every 4 hours PRN    albuterol 2.5 mg, nebulization, Every 6 hours PRN    methylPREDNISolone (Medrol Dospak) 4 mg tablets Follow schedule on package instructions       History obtained from:   Patient    Exam:   Patient Vitals for the past 24 hrs:   BP Temp Temp src Pulse Resp SpO2 Height Weight   12/06/24 1112 137/87 36.5 °C (97.7 °F) Temporal 85 18 99 % 1.651 m (5' 5\") 86.2 kg (190 lb)       A medically appropriate exam performed, outlined above, given the known history and " presentation.    EKG/Cardiac monitor:     Social Determinants of Health considered during this visit:     Medications given during visit:  Medications   methylPREDNISolone sod succinate (SOLU-Medrol) injection 125 mg (125 mg intramuscular Given 12/6/24 1131)   ipratropium-albuteroL (Duo-Neb) 0.5-2.5 mg/3 mL nebulizer solution 3 mL (3 mL nebulization Given 12/6/24 1222)   ipratropium-albuteroL (Duo-Neb) 0.5-2.5 mg/3 mL nebulizer solution 3 mL (3 mL nebulization Given 12/6/24 1206)        Diagnostic/tests:  Labs Reviewed   SARS-COV-2 PCR - Normal       Result Value    Coronavirus 2019, PCR Not Detected      Narrative:     This assay has received FDA Emergency Use Authorization (EUA) and is only authorized for the duration of time that circumstances exist to justify the authorization of the emergency use of in vitro diagnostic tests for the detection of SARS-CoV-2 virus and/or diagnosis of COVID-19 infection under section 564(b)(1) of the Act, 21 U.S.C. 360bbb-3(b)(1). This assay is an in vitro diagnostic nucleic acid amplification test for the qualitative detection of SARS-CoV-2 from nasopharyngeal specimens and has been validated for use at [facility]. Negative results do not preclude COVID-19 infections and should not be used as the sole basis for diagnosis, treatment, or other management decisions.     INFLUENZA A AND B PCR - Normal    Flu A Result Not Detected      Flu B Result Not Detected      Narrative:     This assay is an in vitro diagnostic multiplex nucleic acid amplification test for the detection and discrimination of Influenza A & B from nasopharyngeal specimens, and has been validated for use at [facility]. Negative results do not preclude Influenza A/B infections, and should not be used as the sole basis for diagnosis, treatment, or other management decisions. If Influenza A/B and RSV PCR results are negative, testing for Parainfluenza virus, Adenovirus  and Metapneumovirus is routinely performed for [facility]ology and intensive care inpatients, and is available on other patients by placing an add-on request.        No orders to display        MDM Summary:    We have discussed the diagnosis and risks, and we agree with discharging home to follow-up with appropriate physician as directed. We also discussed returning to the Emergency Department immediately if new or worsening symptoms occur. We have discussed the symptoms which are most concerning that necessitate immediate return. Pt symptoms have been well controlled here and the patient is safe for discharge with appropriate outpatient follow up. The patient has verbalized understanding to return to ER without delay for new or worsening pains or for any other symptoms or concerns. I utilized the discharge clinical management tool provided [employer] to help estimate risk of negative outcome for this patient.      Disposition:  ED Prescriptions       Medication Sig Dispense Start Date End Date Auth. Provider    albuterol 90 mcg/actuation inhaler Inhale 2 puffs every 4 hours if needed for wheezing. 18 g 12/6/2024 1/5/2025 Sarwat Hernandez PA-C    albuterol 5 mg/mL nebulizer solution Take 0.5 mL (2.5 mg) by nebulization every 6 hours if needed for wheezing. 20 mL 12/6/2024 1/5/2025 Sarwat Hernandez PA-C    methylPREDNISolone (Medrol Dospak) 4 mg tablets Follow schedule on package instructions 21 tablet 12/6/2024 12/12/2024 Sarwat Hernandez PA-C              Procedure  Procedures     Sarwat Hernandez PA-C  12/06/24 1931

## 2024-12-09 LAB
ATRIAL RATE: 93 BPM
P AXIS: 64 DEGREES
P OFFSET: 209 MS
P ONSET: 162 MS
PR INTERVAL: 124 MS
Q ONSET: 224 MS
QRS COUNT: 15 BEATS
QRS DURATION: 72 MS
QT INTERVAL: 344 MS
QTC CALCULATION(BAZETT): 427 MS
QTC FREDERICIA: 398 MS
R AXIS: 57 DEGREES
T AXIS: 52 DEGREES
T OFFSET: 396 MS
VENTRICULAR RATE: 93 BPM

## 2024-12-11 ENCOUNTER — HOSPITAL ENCOUNTER (OUTPATIENT)
Dept: RADIOLOGY | Facility: CLINIC | Age: 24
Discharge: HOME | End: 2024-12-11
Payer: COMMERCIAL

## 2024-12-11 ENCOUNTER — OFFICE VISIT (OUTPATIENT)
Dept: PRIMARY CARE | Facility: CLINIC | Age: 24
End: 2024-12-11
Payer: COMMERCIAL

## 2024-12-11 VITALS
DIASTOLIC BLOOD PRESSURE: 68 MMHG | BODY MASS INDEX: 33.12 KG/M2 | WEIGHT: 194 LBS | SYSTOLIC BLOOD PRESSURE: 116 MMHG | HEIGHT: 64 IN

## 2024-12-11 DIAGNOSIS — R06.02 SOB (SHORTNESS OF BREATH) ON EXERTION: ICD-10-CM

## 2024-12-11 DIAGNOSIS — E66.3 OVERWEIGHT: Primary | ICD-10-CM

## 2024-12-11 PROCEDURE — 99214 OFFICE O/P EST MOD 30 MIN: CPT | Performed by: INTERNAL MEDICINE

## 2024-12-11 PROCEDURE — 3008F BODY MASS INDEX DOCD: CPT | Performed by: INTERNAL MEDICINE

## 2024-12-11 PROCEDURE — 71046 X-RAY EXAM CHEST 2 VIEWS: CPT

## 2024-12-11 PROCEDURE — 1036F TOBACCO NON-USER: CPT | Performed by: INTERNAL MEDICINE

## 2024-12-11 PROCEDURE — 71046 X-RAY EXAM CHEST 2 VIEWS: CPT | Performed by: RADIOLOGY

## 2024-12-11 RX ORDER — ALBUTEROL SULFATE 90 UG/1
2 INHALANT RESPIRATORY (INHALATION) 4 TIMES DAILY PRN
Qty: 18 G | Refills: 0 | Status: SHIPPED | OUTPATIENT
Start: 2024-12-11

## 2024-12-11 RX ORDER — NEBULIZER AND COMPRESSOR
EACH MISCELLANEOUS
Qty: 1 EACH | Refills: 0 | Status: SHIPPED | OUTPATIENT
Start: 2024-12-11

## 2024-12-11 RX ORDER — AZITHROMYCIN 250 MG/1
TABLET, FILM COATED ORAL
Qty: 6 TABLET | Refills: 0 | Status: SHIPPED | OUTPATIENT
Start: 2024-12-11 | End: 2024-12-16

## 2024-12-11 RX ORDER — IPRATROPIUM BROMIDE AND ALBUTEROL SULFATE 2.5; .5 MG/3ML; MG/3ML
3 SOLUTION RESPIRATORY (INHALATION) 4 TIMES DAILY PRN
Qty: 90 ML | Refills: 0 | Status: SHIPPED | OUTPATIENT
Start: 2024-12-11 | End: 2025-12-11

## 2024-12-12 NOTE — PROGRESS NOTES
"Subjective   Patient ID: Ursula Bocanegra is a 24 y.o. female who presents for New Patient Visit.    This 24-year-old -American lady came to my office first time.  She has cough, congestion, shortness of breath, wheezing, not feeling good.  She went to urgent care.  She was given prednisone.  Yellow-green sputum.  No x-ray was done.  Weak, tired, fatigued, exhausted, not feeling good.    I have personally reviewed the patient's Past Medical History, Medications, Allergies, Social History, and Family History in the EMR.    OPERATIONS: She had breast reduction and gallbladder.    Review of Systems   All other systems reviewed and are negative.    Objective   /68   Ht 1.626 m (5' 4\")   Wt 88 kg (194 lb)   BMI 33.30 kg/m²     Physical Exam  Vitals reviewed.   HENT:      Right Ear: Tympanic membrane, ear canal and external ear normal.      Left Ear: Tympanic membrane, ear canal and external ear normal.      Nose: Congestion present.      Comments: Postnasal drip.     Mouth/Throat:      Comments: Throat congested.    Eyes:      General: No scleral icterus.     Pupils: Pupils are equal, round, and reactive to light.   Neck:      Vascular: No carotid bruit.   Cardiovascular:      Heart sounds: Normal heart sounds, S1 normal and S2 normal. No murmur heard.     No friction rub.   Pulmonary:      Breath sounds: Wheezing present.   Chest:      Comments: BREAST: Deferred by the patient.  Abdominal:      Palpations: There is no hepatomegaly, splenomegaly or mass.   Genitourinary:     Comments: VAGINAL: Deferred by the patient.  RECTAL: Deferred by the patient.  Musculoskeletal:         General: No swelling or deformity. Normal range of motion.      Cervical back: Neck supple.      Right lower leg: No edema.      Left lower leg: No edema.   Lymphadenopathy:      Cervical: No cervical adenopathy.      Upper Body:      Right upper body: No axillary adenopathy.      Left upper body: No axillary adenopathy.      Lower " Body: No right inguinal adenopathy. No left inguinal adenopathy.   Neurological:      Mental Status: She is oriented to person, place, and time.      Cranial Nerves: Cranial nerves 2-12 are intact. No cranial nerve deficit.      Sensory: No sensory deficit.      Motor: Motor function is intact. No weakness.      Gait: Gait is intact.      Deep Tendon Reflexes: Reflexes normal.   Psychiatric:         Mood and Affect: Mood normal. Mood is not anxious or depressed. Affect is not angry.         Behavior: Behavior is not agitated.         Thought Content: Thought content normal.         Judgment: Judgment normal.     LAB WORK: Laboratory testing discussed.    Assessment/Plan   Problem List Items Addressed This Visit    None  Visit Diagnoses         Codes    Overweight    -  Primary E66.3    SOB (shortness of breath) on exertion     R06.02    Relevant Medications    ipratropium-albuteroL (Duo-Neb) 0.5-2.5 mg/3 mL nebulizer solution    nebulizer and compressor device    albuterol 90 mcg/actuation inhaler    azithromycin (Zithromax) 250 mg tablet    Other Relevant Orders    XR chest 2 views        1. Acute asthmatic bronchitis and cough.  I advised her Breo, albuterol inhaler.  2. Acute asthmatic bronchitis, sinusitis, postnasal drip, congestion.  Z-JAI, Levaquin, Claritin, Robitussin, Flonase.  3. She gets short of breath.  Home nebulizer machine given.  Chest x-ray ordered.  4. Overweight.  Diet, exercise.  5. Blood work ordered.  6. I shall see her back in a week after the testing.  7. Continue to follow.    Donitae Attestation  By signing my name below, IAyleen Scribe attest that this documentation has been prepared under the direction and in the presence of Aram Mercer MD.   All medical record entries made by the scribe were personally dictated by me I have reviewed the chart and agree the record accurately reflects my personal performance of his history physical examination and management

## 2024-12-20 ENCOUNTER — OFFICE VISIT (OUTPATIENT)
Dept: URGENT CARE | Age: 24
End: 2024-12-20
Payer: COMMERCIAL

## 2024-12-20 VITALS
RESPIRATION RATE: 16 BRPM | TEMPERATURE: 97.8 F | BODY MASS INDEX: 32.27 KG/M2 | OXYGEN SATURATION: 97 % | DIASTOLIC BLOOD PRESSURE: 75 MMHG | WEIGHT: 189 LBS | SYSTOLIC BLOOD PRESSURE: 123 MMHG | HEART RATE: 96 BPM | HEIGHT: 64 IN

## 2024-12-20 DIAGNOSIS — B96.89 BACTERIAL VAGINOSIS: Primary | ICD-10-CM

## 2024-12-20 DIAGNOSIS — N76.0 BACTERIAL VAGINOSIS: Primary | ICD-10-CM

## 2024-12-20 DIAGNOSIS — H66.002 NON-RECURRENT ACUTE SUPPURATIVE OTITIS MEDIA OF LEFT EAR WITHOUT SPONTANEOUS RUPTURE OF TYMPANIC MEMBRANE: ICD-10-CM

## 2024-12-20 LAB
CHLAMYDIA TRACHOMATIS: NOT DETECTED
ELECTRONIC CONTROL: NORMAL
INTERNAL PROCESS CONTROL: NORMAL
NEISSERIA GONORRHOEAE: NOT DETECTED
POC APPEARANCE, URINE: CLEAR
POC BACTERIAL VAGINITIS (RAPID): POSITIVE
POC BILIRUBIN, URINE: NEGATIVE
POC BLOOD, URINE: NEGATIVE
POC COLOR, URINE: YELLOW
POC GLUCOSE, URINE: NEGATIVE MG/DL
POC KETONES, URINE: NEGATIVE MG/DL
POC LEUKOCYTES, URINE: NEGATIVE
POC NITRITE,URINE: NEGATIVE
POC PH, URINE: 6 PH
POC PROTEIN, URINE: ABNORMAL MG/DL
POC SPECIFIC GRAVITY, URINE: >=1.03
POC UROBILINOGEN, URINE: 0.2 EU/DL
PREGNANCY TEST URINE, POC: NEGATIVE
TRICHOMONAS VAGINA: NOT DETECTED

## 2024-12-20 PROCEDURE — 87102 FUNGUS ISOLATION CULTURE: CPT

## 2024-12-20 PROCEDURE — 87077 CULTURE AEROBIC IDENTIFY: CPT

## 2024-12-20 RX ORDER — METRONIDAZOLE 7.5 MG/G
GEL VAGINAL 2 TIMES DAILY
Qty: 70 G | Refills: 0 | Status: SHIPPED | OUTPATIENT
Start: 2024-12-20 | End: 2024-12-25

## 2024-12-20 RX ORDER — FLUCONAZOLE 150 MG/1
150 TABLET ORAL SEE ADMIN INSTRUCTIONS
Qty: 2 TABLET | Refills: 0 | Status: SHIPPED | OUTPATIENT
Start: 2024-12-20 | End: 2024-12-21

## 2024-12-20 RX ORDER — AMOXICILLIN AND CLAVULANATE POTASSIUM 875; 125 MG/1; MG/1
1 TABLET, FILM COATED ORAL 2 TIMES DAILY
Qty: 20 TABLET | Refills: 0 | Status: SHIPPED | OUTPATIENT
Start: 2024-12-20 | End: 2024-12-30

## 2024-12-20 ASSESSMENT — PAIN SCALES - GENERAL: PAINLEVEL_OUTOF10: 6

## 2024-12-20 NOTE — PROGRESS NOTES
Chief Complaint   Patient presents with    Earache     Left ear pain last night    Vaginal Discharge       Physical Exam:   GEN: No acute distress    ENT: Left tympanic membrane erythematous, canal unremarkable. Sinus congestion present but sinuses non-tender. Pharynx and tonsils mildly hyperemic but without exudate.     Resp: Lungs clear to auscultation bilaterally     : no CVA or suprapubic tenderness      POC Labs:     Office Visit on 12/20/2024   Component Date Value Ref Range Status    Chlamydia Trachomatis 12/20/2024 Not Detected  Not Detected Final    Neisseria Gonorrhoeae 12/20/2024 Not Detected  Not Detected Final    Trichomonas Vagina 12/20/2024 Not Detected  Not Detected Final    Electronic Control 12/20/2024 Valid   Final    Internal Process Control 12/20/2024 Valid   Final    POC Color, Urine 12/20/2024 Yellow  Straw, Yellow, Light-Yellow Final    POC Appearance, Urine 12/20/2024 Clear  Clear Final    POC Glucose, Urine 12/20/2024 NEGATIVE  NEGATIVE mg/dl Final    POC Bilirubin, Urine 12/20/2024 NEGATIVE  NEGATIVE Final    POC Ketones, Urine 12/20/2024 NEGATIVE  NEGATIVE mg/dl Final    POC Specific Gravity, Urine 12/20/2024 >=1.030  1.005 - 1.035 Final    POC Blood, Urine 12/20/2024 NEGATIVE  NEGATIVE Final    POC PH, Urine 12/20/2024 6.0  No Reference Range Established PH Final    POC Protein, Urine 12/20/2024 TRACE (A)  NEGATIVE mg/dl Final    POC Urobilinogen, Urine 12/20/2024 0.2  0.2, 1.0 EU/DL Final    Poc Nitrite, Urine 12/20/2024 NEGATIVE  NEGATIVE Final    POC Leukocytes, Urine 12/20/2024 NEGATIVE  NEGATIVE Final    Preg Test, Ur 12/20/2024 Negative  Negative Final    POC Bacterial Vaginitis (Rapid) 12/20/2024 Positive (A)  Negative Final        Encounter Diagnoses   Name Primary?    Bacterial vaginosis Yes    Non-recurrent acute suppurative otitis media of left ear without spontaneous rupture of tympanic membrane         Medical Decision Making & Plan:     1) Metro gel vaginal     -yeast  swab     2) Augmentin (+ Diflucan)        12/20/24 at 4:40 PM - Irina Urbina DO

## 2024-12-22 LAB — YEAST SPEC QL CULT: ABNORMAL

## 2024-12-24 LAB — YEAST SPEC QL CULT: ABNORMAL

## 2024-12-27 ENCOUNTER — OFFICE VISIT (OUTPATIENT)
Dept: URGENT CARE | Age: 24
End: 2024-12-27
Payer: COMMERCIAL

## 2024-12-27 ENCOUNTER — ANCILLARY PROCEDURE (OUTPATIENT)
Dept: URGENT CARE | Age: 24
End: 2024-12-27
Payer: COMMERCIAL

## 2024-12-27 VITALS
OXYGEN SATURATION: 97 % | DIASTOLIC BLOOD PRESSURE: 85 MMHG | TEMPERATURE: 98.4 F | WEIGHT: 189 LBS | SYSTOLIC BLOOD PRESSURE: 131 MMHG | RESPIRATION RATE: 18 BRPM | HEIGHT: 64 IN | BODY MASS INDEX: 32.27 KG/M2 | HEART RATE: 90 BPM

## 2024-12-27 DIAGNOSIS — R05.9 COUGH, UNSPECIFIED TYPE: ICD-10-CM

## 2024-12-27 DIAGNOSIS — J45.50 SEVERE PERSISTENT ASTHMA, UNSPECIFIED WHETHER COMPLICATED (MULTI): Primary | ICD-10-CM

## 2024-12-27 DIAGNOSIS — B96.89 ACUTE BACTERIAL BRONCHITIS: ICD-10-CM

## 2024-12-27 DIAGNOSIS — J20.8 ACUTE BACTERIAL BRONCHITIS: ICD-10-CM

## 2024-12-27 PROCEDURE — 71046 X-RAY EXAM CHEST 2 VIEWS: CPT | Performed by: SURGERY

## 2024-12-27 RX ORDER — LEVOFLOXACIN 500 MG/1
500 TABLET, FILM COATED ORAL DAILY
Qty: 10 TABLET | Refills: 0 | Status: SHIPPED | OUTPATIENT
Start: 2024-12-27 | End: 2025-01-06

## 2024-12-27 RX ORDER — PREDNISONE 20 MG/1
40 TABLET ORAL DAILY
Qty: 10 TABLET | Refills: 0 | Status: SHIPPED | OUTPATIENT
Start: 2024-12-27 | End: 2025-01-01

## 2024-12-27 ASSESSMENT — PAIN SCALES - GENERAL: PAINLEVEL_OUTOF10: 0-NO PAIN

## 2024-12-27 NOTE — PROGRESS NOTES
Chief Complaint   Patient presents with    Pain With Breathing     In chest has been sick since 12/6/24 cough, shortness, nothing is helping.       Physical Exam:     GEN: No acute distress    ENT: Bilateral TMs and canals unremarkable, sinus congestion present. Pharynx and tonsils mildly hyperemic but without exudate.     Resp: Lungs clear to auscultation bilaterally     Imaging:       === 12/27/24 ===    XR CHEST 2 VIEWS    - Impression -  Negative exam.    MACRO:  None    Signed by: Jaime Terrell 12/27/2024 4:36 PM  Dictation workstation:   [id number]    Assessment:     Encounter Diagnoses   Name Primary?    Acute bacterial bronchitis     Severe persistent asthma, unspecified whether complicated (Multi) Yes          Medical Decision Making & Plan:   -Levaquin  -Prednisone  -Nebulizer machine          12/27/24 at 5:19 PM - Irina Urbina, DO

## 2025-01-02 ENCOUNTER — HOSPITAL ENCOUNTER (EMERGENCY)
Facility: HOSPITAL | Age: 25
Discharge: HOME | End: 2025-01-02
Attending: STUDENT IN AN ORGANIZED HEALTH CARE EDUCATION/TRAINING PROGRAM
Payer: COMMERCIAL

## 2025-01-02 ENCOUNTER — APPOINTMENT (OUTPATIENT)
Dept: RADIOLOGY | Facility: HOSPITAL | Age: 25
End: 2025-01-02
Payer: COMMERCIAL

## 2025-01-02 ENCOUNTER — APPOINTMENT (OUTPATIENT)
Dept: CARDIOLOGY | Facility: HOSPITAL | Age: 25
End: 2025-01-02
Payer: COMMERCIAL

## 2025-01-02 VITALS
WEIGHT: 185 LBS | HEIGHT: 64 IN | DIASTOLIC BLOOD PRESSURE: 86 MMHG | RESPIRATION RATE: 18 BRPM | BODY MASS INDEX: 31.58 KG/M2 | TEMPERATURE: 98.2 F | SYSTOLIC BLOOD PRESSURE: 129 MMHG | OXYGEN SATURATION: 100 % | HEART RATE: 117 BPM

## 2025-01-02 DIAGNOSIS — R05.1 ACUTE COUGH: ICD-10-CM

## 2025-01-02 DIAGNOSIS — J22 LOWER RESPIRATORY TRACT INFECTION: Primary | ICD-10-CM

## 2025-01-02 DIAGNOSIS — J45.909 UNCOMPLICATED ASTHMA, UNSPECIFIED ASTHMA SEVERITY, UNSPECIFIED WHETHER PERSISTENT (HHS-HCC): ICD-10-CM

## 2025-01-02 LAB
FLUAV RNA RESP QL NAA+PROBE: NOT DETECTED
FLUBV RNA RESP QL NAA+PROBE: NOT DETECTED
RSV RNA RESP QL NAA+PROBE: NOT DETECTED
SARS-COV-2 RNA RESP QL NAA+PROBE: NOT DETECTED

## 2025-01-02 PROCEDURE — 2550000001 HC RX 255 CONTRASTS: Performed by: CLINICAL NURSE SPECIALIST

## 2025-01-02 PROCEDURE — 71275 CT ANGIOGRAPHY CHEST: CPT | Performed by: RADIOLOGY

## 2025-01-02 PROCEDURE — 87637 SARSCOV2&INF A&B&RSV AMP PRB: CPT | Performed by: CLINICAL NURSE SPECIALIST

## 2025-01-02 PROCEDURE — 2500000002 HC RX 250 W HCPCS SELF ADMINISTERED DRUGS (ALT 637 FOR MEDICARE OP, ALT 636 FOR OP/ED): Performed by: CLINICAL NURSE SPECIALIST

## 2025-01-02 PROCEDURE — 2500000001 HC RX 250 WO HCPCS SELF ADMINISTERED DRUGS (ALT 637 FOR MEDICARE OP): Performed by: CLINICAL NURSE SPECIALIST

## 2025-01-02 PROCEDURE — 71275 CT ANGIOGRAPHY CHEST: CPT

## 2025-01-02 PROCEDURE — 93005 ELECTROCARDIOGRAM TRACING: CPT

## 2025-01-02 PROCEDURE — 94640 AIRWAY INHALATION TREATMENT: CPT

## 2025-01-02 PROCEDURE — 99285 EMERGENCY DEPT VISIT HI MDM: CPT | Mod: 25 | Performed by: STUDENT IN AN ORGANIZED HEALTH CARE EDUCATION/TRAINING PROGRAM

## 2025-01-02 RX ORDER — ALBUTEROL SULFATE 0.83 MG/ML
2.5 SOLUTION RESPIRATORY (INHALATION) ONCE
Status: COMPLETED | OUTPATIENT
Start: 2025-01-02 | End: 2025-01-02

## 2025-01-02 RX ORDER — IPRATROPIUM BROMIDE AND ALBUTEROL SULFATE 2.5; .5 MG/3ML; MG/3ML
3 SOLUTION RESPIRATORY (INHALATION) ONCE
Status: COMPLETED | OUTPATIENT
Start: 2025-01-02 | End: 2025-01-02

## 2025-01-02 RX ORDER — ACETAMINOPHEN 325 MG/1
650 TABLET ORAL ONCE
Status: COMPLETED | OUTPATIENT
Start: 2025-01-02 | End: 2025-01-02

## 2025-01-02 RX ORDER — BENZONATATE 100 MG/1
100 CAPSULE ORAL 3 TIMES DAILY PRN
Qty: 21 CAPSULE | Refills: 0 | Status: SHIPPED | OUTPATIENT
Start: 2025-01-02 | End: 2025-01-09

## 2025-01-02 RX ORDER — BROMPHENIRAMINE MALEATE, PSEUDOEPHEDRINE HYDROCHLORIDE, AND DEXTROMETHORPHAN HYDROBROMIDE 2; 30; 10 MG/5ML; MG/5ML; MG/5ML
5 SYRUP ORAL 4 TIMES DAILY PRN
Qty: 120 ML | Refills: 0 | Status: SHIPPED | OUTPATIENT
Start: 2025-01-02 | End: 2025-01-07

## 2025-01-02 RX ORDER — BENZONATATE 100 MG/1
100 CAPSULE ORAL ONCE
Status: COMPLETED | OUTPATIENT
Start: 2025-01-02 | End: 2025-01-02

## 2025-01-02 RX ADMIN — IPRATROPIUM BROMIDE AND ALBUTEROL SULFATE 3 ML: 2.5; .5 SOLUTION RESPIRATORY (INHALATION) at 16:04

## 2025-01-02 RX ADMIN — IOHEXOL 75 ML: 350 INJECTION, SOLUTION INTRAVENOUS at 17:32

## 2025-01-02 RX ADMIN — ACETAMINOPHEN 650 MG: 325 TABLET ORAL at 15:59

## 2025-01-02 RX ADMIN — ALBUTEROL SULFATE 2.5 MG: 2.5 SOLUTION RESPIRATORY (INHALATION) at 16:04

## 2025-01-02 RX ADMIN — IPRATROPIUM BROMIDE AND ALBUTEROL SULFATE 3 ML: 2.5; .5 SOLUTION RESPIRATORY (INHALATION) at 16:02

## 2025-01-02 RX ADMIN — BENZONATATE 100 MG: 100 CAPSULE ORAL at 15:59

## 2025-01-02 ASSESSMENT — LIFESTYLE VARIABLES
TOTAL SCORE: 0
EVER HAD A DRINK FIRST THING IN THE MORNING TO STEADY YOUR NERVES TO GET RID OF A HANGOVER: NO
HAVE YOU EVER FELT YOU SHOULD CUT DOWN ON YOUR DRINKING: NO
EVER FELT BAD OR GUILTY ABOUT YOUR DRINKING: NO
HAVE PEOPLE ANNOYED YOU BY CRITICIZING YOUR DRINKING: NO

## 2025-01-02 ASSESSMENT — PAIN SCALES - GENERAL
PAINLEVEL_OUTOF10: 0 - NO PAIN
PAINLEVEL_OUTOF10: 5 - MODERATE PAIN
PAINLEVEL_OUTOF10: 5 - MODERATE PAIN

## 2025-01-02 ASSESSMENT — COLUMBIA-SUICIDE SEVERITY RATING SCALE - C-SSRS
2. HAVE YOU ACTUALLY HAD ANY THOUGHTS OF KILLING YOURSELF?: NO
1. IN THE PAST MONTH, HAVE YOU WISHED YOU WERE DEAD OR WISHED YOU COULD GO TO SLEEP AND NOT WAKE UP?: NO
6. HAVE YOU EVER DONE ANYTHING, STARTED TO DO ANYTHING, OR PREPARED TO DO ANYTHING TO END YOUR LIFE?: NO

## 2025-01-02 ASSESSMENT — PAIN - FUNCTIONAL ASSESSMENT: PAIN_FUNCTIONAL_ASSESSMENT: 0-10

## 2025-01-02 NOTE — ED PROVIDER NOTES
Department of Emergency Medicine   ED  Provider Note  Admit Date/RoomTime: 1/2/2025  3:47 PM  ED Room: ST27/ST        History of Present Illness:  Chief Complaint   Patient presents with    Cough         Ursula Bocanegra is a 24 y.o. female history of asthma presents with cough for 2 weeks right-sided chest tightness.  Patient was seen evaluated urgent care on 12/27/25 diagnosed with acute bacterial bronchitis placed on Levaquin prednisone and nebulizer.  Had a negative chest x-ray at that time.  Patient states  was not able to get the medication filled until Monday due to pharmacy delay.  She has not received an nebulizer because it was sent to the wrong pharmacy.  She is taking the steroid she started also on Monday.  Reports she has 10 days of antibiotics and 5 days of the steroid.  Patient states she has been sick since Thanksgiving was placed on antibiotic therapy amoxicillin completed that for an otitis media.  Has had a persistent cough shortness of breath no wheezing complains of a runny nose no sore throat no facial pain or pressure.  Then developed pain in her right chest 27 December where she was seen evaluated in urgent care had a negative chest x-ray and was placed on antibiotic therapy for bacterial infection.  She denies any fever or chills.  No nausea no vomiting.  Intermittent diarrhea related to the antibiotic.  No increased weight gain no cardiac history.  Complains of nasal congestion.  Denies pregnancy    Review of Systems:   Pertinent positives and negatives are stated within HPI, all other systems reviewed and are negative.        --------------------------------------------- PAST HISTORY ---------------------------------------------  Past Medical History:  has a past medical history of Asthma, Other conditions influencing health status (04/21/2015), Personal history of other diseases of the respiratory system (04/21/2015), Personal history of other mental and behavioral disorders, Personal  history of other mental and behavioral disorders (12/03/2017), Personal history of other mental and behavioral disorders (12/03/2017), Personal history of other specified conditions, Personal history of other specified conditions (12/20/2017), Right upper quadrant pain, and Unspecified mood (affective) disorder (CMS-HCC) (12/03/2017).  Past Surgical History:  has a past surgical history that includes Other surgical history (03/17/2020) and Other surgical history (03/17/2020).  Social History:  reports that she has never smoked. She has never been exposed to tobacco smoke. She has never used smokeless tobacco. She reports that she does not drink alcohol and does not use drugs.  Family History: family history includes Hypertension in her father and mother.. Unless otherwise noted, family history is non contributory  The patient’s home medications have been reviewed.  Allergies: Bee pollen and Dog dander        ---------------------------------------------------PHYSICAL EXAM--------------------------------------    GENERAL APPEARANCE: Awake and alert.   VITAL SIGNS: As per the nurses' triage record.  Tachycardic  HEENT: Normocephalic, atraumatic. Extraocular muscles are intact. Pupils equal round and reactive to light. Conjunctiva are pink. Negative scleral icterus. Mucous membranes are moist. Tongue in the midline. Pharynx was without erythema or exudates, uvula midline.  Left tympanic membrane erythematous right tympanic membrane occluded with wax no sign of otitis externa or otitis media.  Clear drainage posterior pharynx no pain palpation  Frontal sinuses  NECK: Soft Nontender and supple, full gross ROM, no meningeal signs.  CHEST: Nontender to palpation.  Diminished.  No use of accessory muscles or nasal flaring no pursed of breathing or tripod positioning noted able to talk in a complete sentence.  She is not hypoxic or tachypneic.  Loose barky nonproductive cough.    HEART: Tachycardia S1, S2. Regular rate and  "rhythm. No murmurs, gallops or rubs.  Strong and equal pulses in the extremities.   ABDOMEN: Soft, nontender, nondistended, positive bowel sounds, no palpable masses.  MUSCULCSKELETAL: Full gross active range of motion. Ambulating on own with no acute difficulties  NEUROLOGICAL: Awake, alert and oriented x 3. Power intact in the upper and lower extremities. Sensation is intact to light touch in the upper and lower extremities.   IMMUNOLOGICAL: No lymphatic streaking noted   DERM: No petechiae, rashes, or ecchymoses.          ------------------------- NURSING NOTES AND VITALS REVIEWED ---------------------------  The nursing notes within the ED encounter and vital signs as below have been reviewed by myself  /86   Pulse (!) 117   Temp 36.8 °C (98.2 °F) (Temporal)   Resp 18   Ht 1.626 m (5' 4\")   Wt 83.9 kg (185 lb)   SpO2 100%   BMI 31.76 kg/m²     Oxygen Saturation Interpretation: 100% room air        The patient’s available past medical records and past encounters were reviewed.          -----------------------DIAGNOSTIC RESULTS------------------------  LABS:    Labs Reviewed   SARS-COV-2 AND INFLUENZA A/B PCR - Normal       Result Value    Flu A Result Not Detected      Flu B Result Not Detected      Coronavirus 2019, PCR Not Detected      Narrative:     This assay has received FDA Emergency Use Authorization (EUA) and  is only authorized for the duration of time that circumstances exist to justify the authorization of the emergency use of in vitro diagnostic tests for the detection of SARS-CoV-2 virus and/or diagnosis of COVID-19 infection under section 564(b)(1) of the Act, 21 U.S.C. 360bbb-3(b)(1). Testing for SARS-CoV-2 is only recommended for patients who meet current clinical and/or epidemiological criteria as defined by federal, state, or local public health directives. This assay is an in vitro diagnostic nucleic acid amplification test for the qualitative detection of SARS-CoV-2, Influenza A, " and Influenza B from nasopharyngeal specimens and has been validated for use at [facility]. Negative results do not preclude COVID-19 infections or Influenza A/B infections, and should not be used as the sole basis for diagnosis, treatment, or other management decisions. If Influenza A/B and RSV PCR results are negative, testing for Parainfluenza virus, Adenovirus and Metapneumovirus is routinely performed for [facility] pediatric oncology and intensive care inpatients, and is available on other patients by placing an add-on request.    RSV PCR - Normal    RSV PCR Not Detected      Narrative:     This assay is an FDA-cleared, in vitro diagnostic nucleic acid amplification test for the detection of RSV from nasopharyngeal specimens, and has been validated for use at [facility]. Negative results do not preclude RSV infections, and should not be used as the sole basis for diagnosis, treatment, or other management decisions. If Influenza A/B and RSV PCR results are negative, testing for Parainfluenza virus, Adenovirus and Metapneumovirus is routinely performed for pediatric oncology and intensive care inpatients at [facility], and is available on other patients by placing an add-on request.           As interpreted by me, the above displayed labs are abnormal. All other labs obtained during this visit were within normal range or not returned as of this dictation.      EKG Interpretation    1619 EKG my interpretation sinus tachycardia with rate of 106 bpm.  Normal axis.  QTc 435 ms, RI interval 128.  No ST elevation or depression, no acute ischemic pattern.  No STEMI.  Tachycardic, otherwise normal EKG. [NT]           CT angio chest for pulmonary embolism   Final Result   No acute pulmonary embolism or other acute cardiopulmonary process.        Residual thymic tissue again noted, slightly less prominent.        Cholecystectomy.             MACRO:   None.        Signed by: Lina Salcido  1/2/2025 6:20 PM   Dictation workstation:   [id number]              CT angio chest for pulmonary embolism   Final Result   No acute pulmonary embolism or other acute cardiopulmonary process.        Residual thymic tissue again noted, slightly less prominent.        Cholecystectomy.             MACRO:   None.        Signed by: Lina Salcido 1/2/2025 6:20 PM   Dictation workstation:   [id number]              ------------------------------ ED COURSE/MEDICAL DECISION MAKING----------------------  Medical Decision Making:   Exam: A medically appropriate exam performed, outlined above, given the known history and presentation.    History obtained from: Review of medical record nursing notes patient      Social Determinants of Health considered during this visit: Takes care of herself at home no sick contacts      PAST MEDICAL HISTORY/Chronic Conditions Affecting Care     has a past medical history of Asthma, Other conditions influencing health status (04/21/2015), Personal history of other diseases of the respiratory system (04/21/2015), Personal history of other mental and behavioral disorders, Personal history of other mental and behavioral disorders (12/03/2017), Personal history of other mental and behavioral disorders (12/03/2017), Personal history of other specified conditions, Personal history of other specified conditions (12/20/2017), Right upper quadrant pain, and Unspecified mood (affective) disorder (CMS-HCC) (12/03/2017).       CC/HPI Summary, Social Determinants of health, Records Reviewed, DDx, testing done/not done, ED Course, Reassessment, disposition considerations/shared decision making with patient, consults, disposition:   Presents with persistent cough right-sided chest pain nasal congestion onset of symptoms since Thanksgiving   Plan:  Tylenol  DuoNeb x 3 albuterol.  Tessalon Perles  CT angio chest No acute pulmonary embolism or other acute cardiopulmonary process.      Residual thymic tissue  again noted, slightly less prominent.      Cholecystectomy.  EKG  RSV  COVID flu     Medical Decision Making/Differential Diagnosis:  Differentials include not limited to viral illness versus pneumonia versus atypical pneumonia versus PE versus asthma exacerbation.  Review  Flu negative  COVID-negative  RSV negative  Patient presented to the emergency department complaints of cough congestion currently on Levaquin for lower respiratory tract infection.  And steroids.  CT of the chest showed no acute cardiopulmonary embolism or other acute cardiopulmonary process.  Thymic tissue again noted from previous exam.  She is not hypoxic febrile or hypotensive.  Heart rate was elevated at 110.  COVID flu RSV are negative.  Patient does have significant history of asthma.  Will have her continue with her Levaquin for lower respiratory tract infection.  Lung sounds reprocessed after breathing treatment clear good airflow.  No use of accessory muscles or nasal flaring noted no pursed of breathing or tripod positioning noted.  Will give her a hard prescription for the nebulizer she already has medications ordered.  Close follow-up with primary care physician return with any worsening symptoms or concerns supportive care measures at home Tessalon Perles for cough  Patient seen and evaluated with attending physician Dr. Acuña   [facility] for discharge utilized discharge planning patient's heart rate is 117.  CT showed no PE no signs of sepsis or toxicity suspect this is secondary to her receiving multiple breathing treatments.  She reports improvement of symptoms she is not hypoxic or tachypneic no signs of sepsis or toxicity.  PROCEDURES  Unless otherwise noted below, none      CONSULTS:   None      ED Course as of 01/03/25 0032   Thu Jan 02, 2025   1619 EKG my interpretation sinus tachycardia with rate of 106 bpm.  Normal axis.  QTc 435 ms, GA interval 128.  No ST elevation or depression, no acute ischemic pattern.  No STEMI.   Tachycardic, otherwise normal EKG. [NT]      ED Course User Index  [NT] Silvino KAREN Acuña DO         Diagnoses as of 01/03/25 0032   Uncomplicated asthma, unspecified asthma severity, unspecified whether persistent (HHS-HCC)   Acute cough   Lower respiratory tract infection         This patient has remained hemodynamically stable during their ED course.      Critical Care: none        Counseling:  The emergency provider has spoken with the patient and discussed today’s results, in addition to providing specific details for the plan of care and counseling regarding the diagnosis and prognosis.  Questions are answered at this time and they are agreeable with the plan.         --------------------------------- IMPRESSION AND DISPOSITION ---------------------------------    IMPRESSION  1. Lower respiratory tract infection    2. Uncomplicated asthma, unspecified asthma severity, unspecified whether persistent (HHS-HCC)    3. Acute cough        DISPOSITION  Disposition: Discharge home  Patient condition is stable improved        NOTE: This report was transcribed using voice recognition software. Every effort was made to ensure accuracy; however, inadvertent computerized transcription errors may be present      KALYANI Mora-JOHAN  01/03/25 0032

## 2025-01-02 NOTE — DISCHARGE INSTRUCTIONS
Increase fluids  Coolmist vaporizer in the home  Continue with your Levaquin and steroids  Follow-up with primary care physician  Benoit was provided for cough  Monitor for worsening signs and symptoms of infection or respiratory distress  Continue with your inhaler a prescription for nebulizer was provided to you  Return with any worsening symptoms or concerns  Monitor for worsening signs and symptoms of infection or respiratory distress

## 2025-01-02 NOTE — Clinical Note
Ursula Bocanegra was seen and treated in our emergency department on 1/2/2025.  She may return to work on 01/05/2025.  1-3 days  if needed      If you have any questions or concerns, please don't hesitate to call.      Silvino Acuña, DO

## 2025-01-03 LAB
ATRIAL RATE: 106 BPM
P AXIS: 82 DEGREES
P OFFSET: 205 MS
P ONSET: 158 MS
PR INTERVAL: 128 MS
Q ONSET: 222 MS
QRS COUNT: 17 BEATS
QRS DURATION: 74 MS
QT INTERVAL: 328 MS
QTC CALCULATION(BAZETT): 435 MS
QTC FREDERICIA: 396 MS
R AXIS: 80 DEGREES
T AXIS: 74 DEGREES
T OFFSET: 386 MS
VENTRICULAR RATE: 106 BPM

## 2025-03-09 ENCOUNTER — APPOINTMENT (OUTPATIENT)
Dept: RADIOLOGY | Facility: HOSPITAL | Age: 25
DRG: 958 | End: 2025-03-09
Payer: COMMERCIAL

## 2025-03-09 ENCOUNTER — LAB REQUISITION (OUTPATIENT)
Dept: LAB | Facility: HOSPITAL | Age: 25
DRG: 958 | End: 2025-03-09
Payer: COMMERCIAL

## 2025-03-09 ENCOUNTER — SURGERY (OUTPATIENT)
Age: 25
End: 2025-03-09
Payer: COMMERCIAL

## 2025-03-09 ENCOUNTER — APPOINTMENT (OUTPATIENT)
Dept: CARDIOLOGY | Facility: HOSPITAL | Age: 25
DRG: 958 | End: 2025-03-09
Payer: COMMERCIAL

## 2025-03-09 PROBLEM — J45.909 ASTHMA: Status: ACTIVE | Noted: 2025-03-09

## 2025-03-09 PROBLEM — S82.202B TYPE I OR II OPEN FRACTURE OF LEFT TIBIA AND FIBULA: Status: ACTIVE | Noted: 2025-03-09

## 2025-03-09 PROBLEM — S34.102A: Status: ACTIVE | Noted: 2025-03-09

## 2025-03-09 PROBLEM — S82.402B TYPE I OR II OPEN FRACTURE OF LEFT TIBIA AND FIBULA: Status: ACTIVE | Noted: 2025-03-09

## 2025-03-09 PROBLEM — S32.022A: Status: ACTIVE | Noted: 2025-03-09

## 2025-03-09 LAB — B-HCG SERPL-ACNC: <3 MIU/ML

## 2025-03-09 PROCEDURE — 73590 X-RAY EXAM OF LOWER LEG: CPT | Mod: LT

## 2025-03-09 PROCEDURE — 73700 CT LOWER EXTREMITY W/O DYE: CPT | Mod: LT

## 2025-03-09 PROCEDURE — 93005 ELECTROCARDIOGRAM TRACING: CPT

## 2025-03-09 PROCEDURE — 70450 CT HEAD/BRAIN W/O DYE: CPT

## 2025-03-09 PROCEDURE — 84702 CHORIONIC GONADOTROPIN TEST: CPT | Mod: OUT | Performed by: EMERGENCY MEDICINE

## 2025-03-09 PROCEDURE — 72148 MRI LUMBAR SPINE W/O DYE: CPT

## 2025-03-09 PROCEDURE — 73560 X-RAY EXAM OF KNEE 1 OR 2: CPT | Mod: LT

## 2025-03-09 PROCEDURE — 72170 X-RAY EXAM OF PELVIS: CPT

## 2025-03-09 PROCEDURE — 72125 CT NECK SPINE W/O DYE: CPT

## 2025-03-09 PROCEDURE — 73610 X-RAY EXAM OF ANKLE: CPT | Mod: LT

## 2025-03-09 PROCEDURE — 71045 X-RAY EXAM CHEST 1 VIEW: CPT

## 2025-03-09 RX ADMIN — VANCOMYCIN HYDROCHLORIDE 2 G: 1025 INJECTION, POWDER, FOR SOLUTION INTRAVENOUS; ORAL at 12:37

## 2025-03-09 RX ADMIN — POLYMYXIN B 500000 UNITS: 500000 INJECTION, POWDER, LYOPHILIZED, FOR SOLUTION INTRAMUSCULAR; INTRATHECAL; INTRAVENOUS; OPHTHALMIC at 10:54

## 2025-03-09 RX ADMIN — SODIUM CHLORIDE 2000 ML: 900 IRRIGANT IRRIGATION at 10:55

## 2025-03-09 RX ADMIN — POLYMYXIN B 500000 UNITS: 500000 INJECTION, POWDER, LYOPHILIZED, FOR SOLUTION INTRAMUSCULAR; INTRATHECAL; INTRAVENOUS; OPHTHALMIC at 10:55

## 2025-03-10 ENCOUNTER — APPOINTMENT (OUTPATIENT)
Dept: RADIOLOGY | Facility: HOSPITAL | Age: 25
DRG: 958 | End: 2025-03-10
Payer: COMMERCIAL

## 2025-03-10 PROCEDURE — 73620 X-RAY EXAM OF FOOT: CPT | Mod: RT

## 2025-03-11 ENCOUNTER — APPOINTMENT (OUTPATIENT)
Dept: RADIOLOGY | Facility: HOSPITAL | Age: 25
DRG: 958 | End: 2025-03-11
Payer: COMMERCIAL

## 2025-03-12 ENCOUNTER — APPOINTMENT (OUTPATIENT)
Dept: RADIOLOGY | Facility: HOSPITAL | Age: 25
DRG: 958 | End: 2025-03-12
Payer: COMMERCIAL

## 2025-03-12 PROCEDURE — 74018 RADEX ABDOMEN 1 VIEW: CPT

## 2025-03-13 ENCOUNTER — APPOINTMENT (OUTPATIENT)
Dept: RADIOLOGY | Facility: HOSPITAL | Age: 25
DRG: 958 | End: 2025-03-13
Payer: COMMERCIAL

## 2025-03-13 PROCEDURE — 72100 X-RAY EXAM L-S SPINE 2/3 VWS: CPT

## 2025-03-18 ENCOUNTER — DOCUMENTATION (OUTPATIENT)
Dept: BEHAVIORAL HEALTH | Facility: HOSPITAL | Age: 25
End: 2025-03-18
Payer: COMMERCIAL

## 2025-03-18 NOTE — PROGRESS NOTES
Ursula Bocanegra is a 24 y.o. female on day 0 of admission presenting with No Principal Problem: There is no principal problem currently on the Problem List. Please update the Problem List and refresh..      Subjective   ***       Objective     Last Recorded Vitals  There were no vitals taken for this visit.    Review of Systems    Psychiatric ROS - Adult  Anxiety: {Anxiety:63424867}  Depression: {Depression:65441714}  Delirium: {Delirium:61402890}  Psychosis: {Psychosis:73695179}  Chantelle: {Chantelle:21245992}  Safety Issues: {Safety Issues:10557433}  Psychiatric ROS Comment: ***    Physical Exam      Mental Status Exam  General: ***  Appearance: ***  Attitude: ***  Behavior: ***  Motor Activity: ***  Speech: ***  Mood: ***  Affect: ***  Thought Process: ***  Thought Content: ***  Thought Perception: ***  Cognition: ***  Insight: ***  Judgement: ***    Psychiatric Risk Assessment  Violence Risk Assessment: {Violence Risk Assessment:45093}  Acute Risk of Harm to Others is Considered: {Low/ModHigh:92331}   Suicide Risk Assessment: {Suicide Risk Assessment:22882}  Protective Factors against Suicide: {Protective Factors:70132}  Acute Risk of Harm to Self is Considered: {Low/ModHigh:33205}    Relevant Results  {If you would like to pull in Medications, type .meds     If you would like to pull in Lab results for the last 24 hours, type .[id number]    If you would like to pull in Imaging results, type .imgrslt :99}      {Link to Stroke Scoring tools - Link :99}       Assessment/Plan   Assessment & Plan    ***                {This patient does not have an ACP note on file for this encounter, please fill one out - Advance Care Planning Activity :99}    I spent *** minutes in the professional and overall care of this patient.      Lorena Rivera, APRN-CNP, APRN-CNS

## 2025-03-22 ENCOUNTER — APPOINTMENT (OUTPATIENT)
Dept: RADIOLOGY | Facility: HOSPITAL | Age: 25
End: 2025-03-22
Payer: MEDICARE

## 2025-03-22 ENCOUNTER — APPOINTMENT (OUTPATIENT)
Dept: RADIOLOGY | Facility: HOSPITAL | Age: 25
DRG: 958 | End: 2025-03-22
Payer: COMMERCIAL

## 2025-03-22 PROBLEM — L29.9 ITCHING: Status: ACTIVE | Noted: 2025-03-22

## 2025-03-22 PROBLEM — R11.0 NAUSEA: Status: ACTIVE | Noted: 2025-03-22

## 2025-03-24 LAB
BACTERIA UR CULT: NORMAL
T VAGINALIS RRNA SPEC QL NAA+PROBE: NEGATIVE
T VAGINALIS RRNA SPEC QL NAA+PROBE: POSITIVE

## 2025-03-24 NOTE — PROGRESS NOTES
Ursula Bocanegra is a 24 y.o. female on day 0 of admission presenting with No Principal Problem: There is no principal problem currently on the Problem List. Please update the Problem List and refresh..      Subjective   ***       Objective     Last Recorded Vitals  There were no vitals taken for this visit.    Review of Systems    Psychiatric ROS - Adult  Anxiety: {Anxiety:23523278}  Depression: {Depression:29600650}  Delirium: {Delirium:01575601}  Psychosis: {Psychosis:30564566}  Chantelle: {Chantelle:83089824}  Safety Issues: {Safety Issues:50437892}  Psychiatric ROS Comment: ***    Physical Exam      Mental Status Exam  General: ***  Appearance: ***  Attitude: ***  Behavior: ***  Motor Activity: ***  Speech: ***  Mood: ***  Affect: ***  Thought Process: ***  Thought Content: ***  Thought Perception: ***  Cognition: ***  Insight: ***  Judgement: ***    Psychiatric Risk Assessment  Violence Risk Assessment: {Violence Risk Assessment:65751}  Acute Risk of Harm to Others is Considered: {Low/ModHigh:38075}   Suicide Risk Assessment: {Suicide Risk Assessment:71237}  Protective Factors against Suicide: {Protective Factors:26325}  Acute Risk of Harm to Self is Considered: {Low/ModHigh:61888}    Relevant Results  {If you would like to pull in Medications, type .meds     If you would like to pull in Lab results for the last 24 hours, type .[email or web address]    If you would like to pull in Imaging results, type .imgrslt :99}      {Link to Stroke Scoring tools - Link :99}       Assessment/Plan   Assessment & Plan    ***                {This patient does not have an ACP note on file for this encounter, please fill one out - Advance Care Planning Activity :99}    I spent *** minutes in the professional and overall care of this patient.      Lorena Rivera, APRN-CNP, APRN-CNS

## 2025-03-24 NOTE — PROGRESS NOTES
Ursula Bocanegra is a 24 y.o. female on day 0 of admission presenting with No Principal Problem: There is no principal problem currently on the Problem List. Please update the Problem List and refresh..      Subjective   ***       Objective     Last Recorded Vitals  There were no vitals taken for this visit.    Review of Systems    Psychiatric ROS - Adult  Anxiety: {Anxiety:99956406}  Depression: {Depression:81775823}  Delirium: {Delirium:69692702}  Psychosis: {Psychosis:10004211}  Chantelle: {Chantelle:07997134}  Safety Issues: {Safety Issues:58487278}  Psychiatric ROS Comment: ***    Physical Exam      Mental Status Exam  General: ***  Appearance: ***  Attitude: ***  Behavior: ***  Motor Activity: ***  Speech: ***  Mood: ***  Affect: ***  Thought Process: ***  Thought Content: ***  Thought Perception: ***  Cognition: ***  Insight: ***  Judgement: ***    Psychiatric Risk Assessment  Violence Risk Assessment: {Violence Risk Assessment:38076}  Acute Risk of Harm to Others is Considered: {Low/ModHigh:79526}   Suicide Risk Assessment: {Suicide Risk Assessment:03551}  Protective Factors against Suicide: {Protective Factors:83626}  Acute Risk of Harm to Self is Considered: {Low/ModHigh:26207}    Relevant Results  {If you would like to pull in Medications, type .meds     If you would like to pull in Lab results for the last 24 hours, type .[id number]    If you would like to pull in Imaging results, type .imgrslt :99}      {Link to Stroke Scoring tools - Link :99}       Assessment/Plan   Assessment & Plan    ***                {This patient does not have an ACP note on file for this encounter, please fill one out - Advance Care Planning Activity :99}    I spent *** minutes in the professional and overall care of this patient.      Lorena Rivera, APRN-CNP, APRN-CNS

## 2025-03-25 LAB — BACTERIA UR CULT: NORMAL

## 2025-04-01 DIAGNOSIS — S34.102A: ICD-10-CM

## 2025-04-02 NOTE — PROGRESS NOTES
Corresponded with Lillian Abdullahi at [facility] with Physical Medicine and Rehabilitation, PGY-3 regarding the patient. She inquired about staple and suture removal as well as weight-bearing status. LISA Plunkett, and I reviewed images of the patient’s incision sites and determined that all staples and sutures may be removed, and the patient may be placed in a walking boot. Advised that the patient will need the following X-rays to determine weight-bearing status:    Left tibia: AP and lateral    Left ankle: AP, Mortise, and lateral    Facility nurse was informed and acknowledged the plan.

## 2025-04-04 ENCOUNTER — TELEPHONE (OUTPATIENT)
Dept: ORTHOPEDIC SURGERY | Facility: HOSPITAL | Age: 25
End: 2025-04-04
Payer: COMMERCIAL

## 2025-04-04 NOTE — TELEPHONE ENCOUNTER
Called patient's cell phone and patient's mother's phone twice to check in. Patient is admitted to [facility] rehabilitation [facility]. I am able to chart check her notes through Care Everywhere which do demonstrate she is proceeding with her recovery. She appears to have neurogenic bowel and bladder for which she is receiving care.  It seems that she has some right lower extremity numbness but does have good strength against resistance to right knee extension, active dorsiflexion, and active plantarflexion.  She is able to wiggle her toes on the left with respect to her ankle fracture which she had fixed.    She is still pending XR evaluation of her thoracolumbar fracture.  Her sutures were removed today.  It seems that the [facility] rehab center does have my office fax number in order to evaluate her fracture alignment and fixation.    I will continue to try to reach out to the patient/family and [facility] rehab in order to follow-up on these considerations.    --    Fahad Smith MD  Orthopaedic Spine Surgery  , Department of Orthopaedic Surgery  [facility]

## 2025-04-08 ENCOUNTER — DOCUMENTATION (OUTPATIENT)
Dept: ORTHOPEDICS | Facility: HOSPITAL | Age: 25
End: 2025-04-08
Payer: COMMERCIAL

## 2025-04-08 NOTE — PROGRESS NOTES
Our clinical nurse coordinator was able to reach out to [facility] in order to receive some updated clinical information about this patient's care.  She is approximately 4 weeks out from open reduction of L2 vertebral fracture with L1-2 and L2-3 laminectomies with a T12-L4 posterior spinal instrumented fusion.  Patient was discharged out of town to [facility].  We have been attempting to coordinate with the facility there with regards to her postoperative follow-up and care.  Patient is unable to make it back up to [place] for follow-up appointments given that her care has now been transitioned to [place].    We did receive some clinical information from the facility.  We were provided updated images of the patient's posterior thoracolumbar wound which appears well-healed.              Patient also had a thoracolumbar XR performed at [facility].  Per the image report, there appear to be satisfactory changes with increased sclerosis/healing of the vertebral body at L2.  The read does not allude to any increased kyphosis.  I did place an image PACS request to have the images electronically sent to us for our review to confirm this.  However, per their report/read, there is satisfactory appearance of the hardware and the reduction.        I will plan to reach out to the patient again in approximately 2 to 3 weeks for repeat clinical and radiographic evaluation.  We would prefer that the patient come up and follow-up with our office.  However, if they are unable to come back to the [place] area, we may need to discuss transitioning her care to someone locally down in [place] for more longitudinal follow-up.    --    Fahad Smith MD  Orthopaedic Spine Surgery  , Department of Orthopaedic Surgery  [facility]

## 2025-04-14 ENCOUNTER — HOSPITAL ENCOUNTER (OUTPATIENT)
Dept: RADIOLOGY | Facility: EXTERNAL LOCATION | Age: 25
Discharge: HOME | End: 2025-04-14
Payer: COMMERCIAL

## 2025-05-22 ENCOUNTER — TELEPHONE (OUTPATIENT)
Dept: ORTHOPEDICS | Facility: HOSPITAL | Age: 25
End: 2025-05-22
Payer: COMMERCIAL

## 2025-05-22 NOTE — TELEPHONE ENCOUNTER
Called Ms. Bocanegra and left message. Since her treatment at [facility], patient has since moved down to [place] and has transitioned her care to [facility]. She was seen by Dr. Carson with [facility] neurosurgery. Per his documentation, patient had an interval CT scan performed on 5/6/2025 which reportedly demonstrated good bony decompression of the canal with instrumented fusion T12 to L4 with intact and well positioned hardware.     Patient appeared to be recovering with 4+ to 5 IP and Q function with 3 left DF/PF and 4+ right DF/PF.     Appears further follow-up will be provided by [facility] neurosurgery team as care has transitioned there. Left message to reach out to our office with any further questions or concerns and that it was a pleasure to take care of her while she was under our care at [facility].     --    Fahad Smith MD  Orthopaedic Spine Surgery  , Department of Orthopaedic Surgery  [facility]